# Patient Record
Sex: MALE | Race: WHITE | Employment: OTHER | URBAN - METROPOLITAN AREA
[De-identification: names, ages, dates, MRNs, and addresses within clinical notes are randomized per-mention and may not be internally consistent; named-entity substitution may affect disease eponyms.]

---

## 2017-01-24 ENCOUNTER — OFFICE VISIT (OUTPATIENT)
Dept: FAMILY MEDICINE CLINIC | Age: 67
End: 2017-01-24

## 2017-01-24 VITALS
WEIGHT: 192.6 LBS | SYSTOLIC BLOOD PRESSURE: 132 MMHG | OXYGEN SATURATION: 95 % | HEART RATE: 82 BPM | TEMPERATURE: 98 F | DIASTOLIC BLOOD PRESSURE: 88 MMHG | BODY MASS INDEX: 29.19 KG/M2 | RESPIRATION RATE: 20 BRPM | HEIGHT: 68 IN

## 2017-01-24 DIAGNOSIS — Z13.39 SCREENING FOR ALCOHOLISM: ICD-10-CM

## 2017-01-24 DIAGNOSIS — Z00.00 ROUTINE GENERAL MEDICAL EXAMINATION AT A HEALTH CARE FACILITY: ICD-10-CM

## 2017-01-24 DIAGNOSIS — M65.332 TRIGGER MIDDLE FINGER OF LEFT HAND: ICD-10-CM

## 2017-01-24 DIAGNOSIS — M18.11 OSTEOARTHRITIS OF THUMB, RIGHT: Primary | ICD-10-CM

## 2017-01-24 DIAGNOSIS — Z87.891 PERSONAL HISTORY OF TOBACCO USE, PRESENTING HAZARDS TO HEALTH: ICD-10-CM

## 2017-01-24 DIAGNOSIS — E11.9 CONTROLLED TYPE 2 DIABETES MELLITUS WITHOUT COMPLICATION, WITHOUT LONG-TERM CURRENT USE OF INSULIN (HCC): ICD-10-CM

## 2017-01-24 NOTE — ACP (ADVANCE CARE PLANNING)
Advance Care Planning    Advance Care Planning (ACP) Provider Conversation Snapshot    Date of ACP Conversation: 01/24/17  Persons included in Conversation:  patient  Length of ACP Conversation in minutes:  25 minutes    Authorized Decision Maker (if patient is incapable of making informed decisions):    This person is:   Healthcare Agent/Medical Power of  under Advance Directive          For Patients with Decision Making Capacity:   Values/Goals: Exploration of values, goals, and preferences if recovery is not expected, even with continued medical treatment in the event of:  Imminent death  Severe, permanent brain injury    Conversation Outcomes / Follow-Up Plan:   Recommended completion of Advance Directive form after review of ACP materials and conversation with prospective healthcare agent

## 2017-01-24 NOTE — PROGRESS NOTES
This is a \"Welcome to [employer]"  Initial Preventive Physical Examination (IPPE) providing Personalized Prevention Plan Services (Performed in the first 12 months of enrollment)    I have reviewed the patient's medical history in detail and updated the computerized patient record. History     Past Medical History   Diagnosis Date    Arthritis     Back pain     Blood in stool     Diabetes ([address]     Scoliosis       Past Surgical History   Procedure Laterality Date    Endoscopy, colon, diagnostic  01/31/06     with polypectomy     Current Outpatient Prescriptions   Medication Sig Dispense Refill    naproxen (NAPROSYN) 500 mg tablet Take 1 Tab by mouth two (2) times daily (with meals). 180 Tab 1    lovastatin (MEVACOR) 20 mg tablet Take 1 Tab by mouth nightly. 90 Tab 1    glipiZIDE (GLUCOTROL) 5 mg tablet TAKE 1 TABLET BY MOUTH EVERY DAY 90 Tab 0    pantoprazole (PROTONIX) 40 mg tablet Take 1 Tab by mouth daily. 90 Tab 3    lisinopril (PRINIVIL, ZESTRIL) 20 mg tablet Take 1 Tab by mouth daily. 90 Tab 1    metFORMIN (GLUCOPHAGE) 500 mg tablet Take 1 Tab by mouth two (2) times daily (with meals). 180 Tab 1    glipiZIDE SR (GLUCOTROL) 5 mg CR tablet Take 1 Tab by mouth daily. 90 Tab 3    hydrocortisone (PROCTOSOL HC) 2.5 % rectal cream Insert  into rectum four (4) times daily. 60 g 1    Aspirin, Buffered 81 mg tab Take  by mouth.  clotrimazole (LOTRIMIN) 1 % topical cream Apply  to affected area two (2) times a day. 15 g 3    cyclobenzaprine (FLEXERIL) 10 mg tablet Take one tab by mouth three times a day as need for pain. 90 Tab 0    omega-3 fatty acids Cap Take  by mouth.  azithromycin (ZITHROMAX) 250 mg tablet Take as directed 6 Tab 0    Brompheniramine-Pseudoeph-DM (BROMFED DM) 2-30-10 mg/5 mL syrup Take 5 mL by mouth four (4) times daily as needed. 120 mL 1    glipiZIDE-metFORMIN (METAGLIP) 2.5-500 mg per tablet Take 1 Tab by mouth Before breakfast and dinner.  180 Tab 1    nystatin (MYCOSTATIN) powder Apply twice daily to affected area 30 g 3    pioglitazone (ACTOS) 15 mg tablet Take 1 Tab by mouth daily. 90 Tab 1    ipratropium (ATROVENT) 0.02 % nebulizer solution 2.5 mL by Nebulization route as needed for Wheezing. 2.5 mL 0     No Known Allergies  No family history on file. Social History   Substance Use Topics    Smoking status: Former Smoker    Smokeless tobacco: Never Used    Alcohol use Not on file     Diet, Lifestyle: generally follows a low fat low cholesterol diet, not attempting to follow a low sodium diet, follows a diabetic diet regularly, sedentary, smoker quit, caffeine intake 1-2 cups daily, alcohol intake 1-2 drinks weekly    Exercise level: not active    Depression Risk Screen     PHQ 2 / 9, over the last two weeks 1/24/2017   Little interest or pleasure in doing things Not at all   Feeling down, depressed or hopeless Not at all   Total Score PHQ 2 0     Alcohol Risk Screen   On any occasion during the past 3 months, have you had more than 4 drinks containing alcohol? No    Do you average more than 14 drinks per week? No    Functional Ability and Level of Safety     Hearing Loss   mild-to-moderate    Activities of Daily Living   Self-care     Fall Risk Screen     Fall Risk Assessment, last 12 mths 1/24/2017   Able to walk? Yes   Fall in past 12 months? No     Abuse Screen   Patient is not abused    Review of Systems   A comprehensive review of systems was negative except for: Musculoskeletal: positive for hand pain    Physical Examination     No exam data present     Visit Vitals    /88    Pulse 82    Temp 98 °F (36.7 °C)    Resp 20    Ht 5' 8\" (1.727 m)    Wt 192 lb 9.6 oz (87.4 kg)    SpO2 95%    BMI 29.28 kg/m2     General:  Alert, cooperative, no distress, appears stated age. Head:  Normocephalic, without obvious abnormality, atraumatic. Eyes:  Conjunctivae/corneas clear. PERRL, EOMs intact.  Fundi benign   Ears:  Normal TMs and external ear canals both ears. Nose: Nares normal. Septum midline. Mucosa normal. No drainage or sinus tenderness. Throat: Lips, mucosa, and tongue normal. Teeth and gums normal.   Neck: Supple, symmetrical, trachea midline, no adenopathy, thyroid: no enlargement/tenderness/nodules, no carotid bruit and no JVD. Back:   Symmetric, no curvature. ROM normal. No CVA tenderness. Lungs:   Clear to auscultation bilaterally. Chest wall:  No tenderness or deformity. Heart:  Regular rate and rhythm, S1, S2 normal, no murmur, click, rub or gallop. Abdomen:   Soft, non-tender. Bowel sounds normal. No masses,  No organomegaly. Genitalia:  Normal male without lesion, discharge or tenderness. Rectal:  Normal tone, normal prostate, no masses or tenderness  Guaiac negative stool. Extremities: Extremities normal, atraumatic, no cyanosis or edema. Pulses: 2+ and symmetric all extremities. Skin: Skin color, texture, turgor normal. No rashes or lesions   Lymph nodes: Cervical, supraclavicular, and axillary nodes normal.   Neurologic: CNII-XII intact. Normal strength, sensation and reflexes throughout. EKG Screening: normal EKG, normal sinus rhythm. Patient Care Team:  Lorenza Oneil MD as PCP - General     End-of-life planning  Advanced Directive discussed and documented: YES    Assessment/Plan   Education and counseling provided:  Are appropriate based on today's review and evaluation  End-of-Life planning (with patient's consent)  Pneumococcal Vaccine  Influenza Vaccine  Prostate cancer screening tests (PSA, covered annually)  Colorectal cancer screening tests  Diabetes screening test  current treatment plan is effective, no change in therapy. Procedure:  After consent was obtained, using sterile technique the tendon was prepped. Local anesthetic used: none. Kenalog 10 mg and was then injected and the needle withdrawn. The procedure was well tolerated.   The patient is asked to continue to rest the area for a few more days before resuming regular activities. It may be more painful for the first 1-2 days. Watch for fever, or increased swelling or persistent pain in the joint. Call or return to clinic prn if such symptoms occur or there is failure to improve as anticipated.     [facility]  OFFICE PROCEDURE PROGRESS NOTE        Chart reviewed for the following:   Nakia Naik MD, have reviewed the History, Physical and updated the Allergic reactions for [email or web address] performed immediately prior to start of procedure:   aNkia Naik MD, have performed the following reviews on Haley Iglesias prior to the start of the procedure:            * Patient was identified by name and date of birth   * Agreement on procedure being performed was verified  * Risks and Benefits explained to the patient  * Procedure site verified and marked as necessary  * Patient was positioned for comfort  * Consent was signed and verified     Time: 10 AM      Date of procedure: 1/24/2017    Procedure performed by:  Sofi Arnett MD     Provider assisted by:  none    Patient assisted by: spouse    How tolerated by patient: tolerated the procedure well with no complications    Post Procedural Pain Scale: 2 - Hurts Little Bit    Comments: none

## 2017-01-24 NOTE — MR AVS SNAPSHOT
Visit Information Date & Time Provider Department Dept. Phone Encounter #  
 1/24/2017  9:15 AM Krista Lane, 3 [facility] [phone] [id number] Follow-up Instructions Return in about 3 months (around 4/24/2017). Your Appointments 5/3/2017 10:15 AM  
Follow Up with Krista Lane MD  
3 [facility]) Appt Note: f/u 6 months [address] [facility] [address] [phone]  
  
   
 [address] [facility] [address] [facility] Upcoming Health Maintenance Date Due HEMOGLOBIN A1C Q6M 5/5/2017 FOOT EXAM Q1 5/9/2017 EYE EXAM RETINAL OR DILATED Q1 5/24/2017 MICROALBUMIN Q1 11/5/2017 LIPID PANEL Q1 11/5/2017 MEDICARE YEARLY EXAM 1/25/2018 GLAUCOMA SCREENING Q2Y 5/24/2018 COLONOSCOPY 6/9/2026 DTaP/Tdap/Td series (2 - Td) 1/24/2027 Allergies as of 1/24/2017  Review Complete On: 1/24/2017 By: Krista Lane MD  
 No Known Allergies Current Immunizations  Never Reviewed No immunizations on file. Not reviewed this visit You Were Diagnosed With   
  
 Codes Comments Osteoarthritis of thumb, right    -  Primary ICD-10-CM: M19.041 ICD-9-CM: 715.34 Normal body mass index (BMI)     ICD-10-CM: Z78.9 ICD-9-CM: V49.89 Routine general medical examination at a health care facility     ICD-10-CM: Z00.00 ICD-9-CM: V70.0 Screening for alcoholism     ICD-10-CM: Z13.89 ICD-9-CM: V79.1 Personal history of tobacco use, presenting hazards to health     ICD-10-CM: F93.967 ICD-9-CM: V15.82 Trigger middle finger of left hand     ICD-10-CM: D79.368 ICD-9-CM: 727.03 Controlled type 2 diabetes mellitus without complication, without long-term current use of insulin ([facility] [address]     ICD-10-CM: E11.9 ICD-9-CM: 250.00 Vitals BP Pulse Temp Resp Height(growth percentile) Weight(growth percentile) 132/88 82 98 °F (36.7 °C) 20 5' 8\" (1.727 m) 192 lb 9.6 oz (87.4 kg) SpO2 BMI Smoking Status 95% 29.28 kg/m2 Former Smoker Vitals History BMI and BSA Data Body Mass Index Body Surface Area  
 29.28 kg/m 2 2.05 m 2 Preferred Pharmacy Pharmacy Name Phone [place] [address] [phone] Your Updated Medication List  
  
   
This list is accurate as of: 1/24/17 10:08 AM.  Always use your most recent med list.  
  
  
  
  
 aspirin, buffered 81 mg Tab Take  by mouth. azithromycin 250 mg tablet Commonly known as:  Danette Ax Take as directed Brompheniramine-Pseudoeph-DM 2-30-10 mg/5 mL syrup Commonly known as:  BROMFED DM Take 5 mL by mouth four (4) times daily as needed. clotrimazole 1 % topical cream  
Commonly known as:  Towana Prateek Apply  to affected area two (2) times a day. cyclobenzaprine 10 mg tablet Commonly known as:  FLEXERIL Take one tab by mouth three times a day as need for pain. * glipiZIDE SR 5 mg CR tablet Commonly known as:  GLUCOTROL XL Take 1 Tab by mouth daily. * glipiZIDE 5 mg tablet Commonly known as:  GLUCOTROL  
TAKE 1 TABLET BY MOUTH EVERY DAY  
  
 glipiZIDE-metFORMIN 2.5-500 mg per tablet Commonly known as:  METAGLIP Take 1 Tab by mouth Before breakfast and dinner. hydrocortisone 2.5 % rectal cream  
Commonly known as:  PROCTOSOL HC Insert  into rectum four (4) times daily. ipratropium 0.02 % nebulizer solution Commonly known as:  ATROVENT  
2.5 mL by Nebulization route as needed for Wheezing. lisinopril 20 mg tablet Commonly known as:  Mollie Ripa Take 1 Tab by mouth daily. lovastatin 20 mg tablet Commonly known as:  MEVACOR Take 1 Tab by mouth nightly. metFORMIN 500 mg tablet Commonly known as:  GLUCOPHAGE Take 1 Tab by mouth two (2) times daily (with meals). naproxen 500 mg tablet Commonly known as:  NAPROSYN Take 1 Tab by mouth two (2) times daily (with meals). nystatin powder Commonly known as:  MYCOSTATIN Apply twice daily to affected area  
  
 omega-3 fatty acids Cap Take  by mouth.  
  
 pantoprazole 40 mg tablet Commonly known as:  PROTONIX Take 1 Tab by mouth daily. pioglitazone 15 mg tablet Commonly known as:  ACTOS Take 1 Tab by mouth daily. triamcinolone acetonide 10 mg/mL injection Commonly known as:  KENALOG  
1 mL by IntraMUSCular route once for 1 dose. * Notice: This list has 2 medication(s) that are the same as other medications prescribed for you. Read the directions carefully, and ask your doctor or other care provider to review them with you. We Performed the Following AMB POC EKG ROUTINE W/ 12 LEADS, SCREEN () [ [facility]] TRIAMCINOLONE ACETONIDE INJ [ [facility]] Follow-up Instructions Return in about 3 months (around 4/24/2017). To-Do List   
 01/24/2017 Imaging:  XR THUMB RT MIN 2 V   
  
 01/27/2017 Imaging:  US EXAM SCREENING AAA Introducing [facility] & [facility] SERVICES! Dear Yamilet De La Cruz: Thank you for requesting a Gekko account. Our records indicate that you already have an active Gekko account. You can access your account anytime at [email or web address] Did you know that you can access your hospital and ER discharge instructions at any time in Gekko? You can also review all of your test results from your hospital stay or ER visit. Additional Information If you have questions, please visit the Frequently Asked Questions section of the Gekko website at [email or web address] Remember, Gekko is NOT to be used for urgent needs. For medical emergencies, dial 911. Now available from your iPhone and Android! Please provide this summary of care documentation to your next provider. Your primary care clinician is listed as Freddy Raw. If you have any questions after today's visit, please call [phone].

## 2017-01-24 NOTE — PROGRESS NOTES
Tyrell Garcia is a 73 y.o. male here today for cortisone shot in finger            1. Have you been to the ER, urgent care clinic since your last visit? Hospitalized since your last visit? No    2. Have you seen or consulted any other health care providers outside of the [address] [place] [address] since your last visit? Include any pap smears or colon screening.  No

## 2017-01-24 NOTE — PROGRESS NOTES
HISTORY OF PRESENT ILLNESS  Tony Rueda is a 73 y.o. male. HPI  aodm stable  hbp stable  C/o trigger finger left hand  Review of Systems   Musculoskeletal: Positive for joint pain. All other systems reviewed and are negative. Past Medical History   Diagnosis Date    Arthritis     Back pain     Blood in stool     Diabetes ([address]     Scoliosis      Current Outpatient Prescriptions on File Prior to Visit   Medication Sig Dispense Refill    naproxen (NAPROSYN) 500 mg tablet Take 1 Tab by mouth two (2) times daily (with meals). 180 Tab 1    lovastatin (MEVACOR) 20 mg tablet Take 1 Tab by mouth nightly. 90 Tab 1    glipiZIDE (GLUCOTROL) 5 mg tablet TAKE 1 TABLET BY MOUTH EVERY DAY 90 Tab 0    pantoprazole (PROTONIX) 40 mg tablet Take 1 Tab by mouth daily. 90 Tab 3    lisinopril (PRINIVIL, ZESTRIL) 20 mg tablet Take 1 Tab by mouth daily. 90 Tab 1    metFORMIN (GLUCOPHAGE) 500 mg tablet Take 1 Tab by mouth two (2) times daily (with meals). 180 Tab 1    glipiZIDE SR (GLUCOTROL) 5 mg CR tablet Take 1 Tab by mouth daily. 90 Tab 3    hydrocortisone (PROCTOSOL HC) 2.5 % rectal cream Insert  into rectum four (4) times daily. 60 g 1    Aspirin, Buffered 81 mg tab Take  by mouth.  clotrimazole (LOTRIMIN) 1 % topical cream Apply  to affected area two (2) times a day. 15 g 3    cyclobenzaprine (FLEXERIL) 10 mg tablet Take one tab by mouth three times a day as need for pain. 90 Tab 0    omega-3 fatty acids Cap Take  by mouth.  azithromycin (ZITHROMAX) 250 mg tablet Take as directed 6 Tab 0    Brompheniramine-Pseudoeph-DM (BROMFED DM) 2-30-10 mg/5 mL syrup Take 5 mL by mouth four (4) times daily as needed. 120 mL 1    glipiZIDE-metFORMIN (METAGLIP) 2.5-500 mg per tablet Take 1 Tab by mouth Before breakfast and dinner. 180 Tab 1    nystatin (MYCOSTATIN) powder Apply twice daily to affected area 30 g 3    pioglitazone (ACTOS) 15 mg tablet Take 1 Tab by mouth daily.  90 Tab 1    ipratropium (ATROVENT) 0.02 % nebulizer solution 2.5 mL by Nebulization route as needed for Wheezing. 2.5 mL 0     No current facility-administered medications on file prior to visit. Visit Vitals    /88    Pulse 82    Temp 98 °F (36.7 °C)    Resp 20    Ht 5' 8\" (1.727 m)    Wt 192 lb 9.6 oz (87.4 kg)    SpO2 95%    BMI 29.28 kg/m2         Physical Exam   Constitutional: He appears well-developed and well-nourished. No distress. Musculoskeletal: He exhibits tenderness. He exhibits no edema or deformity. Tender left middle finger, tender right thumb   Skin: He is not diaphoretic. Vitals reviewed.       ASSESSMENT and PLAN  hbp   aodm  Trigger finger  OA thumb  Plan

## 2017-02-01 ENCOUNTER — HOSPITAL ENCOUNTER (OUTPATIENT)
Dept: ULTRASOUND IMAGING | Age: 67
Discharge: HOME | End: 2017-02-01
Attending: INTERNAL MEDICINE
Payer: MEDICARE

## 2017-02-01 DIAGNOSIS — Z87.891 PERSONAL HISTORY OF TOBACCO USE, PRESENTING HAZARDS TO HEALTH: ICD-10-CM

## 2017-02-01 PROCEDURE — 76706 US ABDL AORTA SCREEN AAA: CPT

## 2017-02-03 ENCOUNTER — OFFICE VISIT (OUTPATIENT)
Dept: FAMILY MEDICINE CLINIC | Age: 67
End: 2017-02-03

## 2017-02-03 VITALS
OXYGEN SATURATION: 95 % | RESPIRATION RATE: 20 BRPM | HEIGHT: 68 IN | HEART RATE: 72 BPM | WEIGHT: 192 LBS | TEMPERATURE: 97.5 F | BODY MASS INDEX: 29.1 KG/M2 | SYSTOLIC BLOOD PRESSURE: 116 MMHG | DIASTOLIC BLOOD PRESSURE: 79 MMHG

## 2017-02-03 DIAGNOSIS — M18.11 OSTEOARTHRITIS OF RIGHT THUMB: ICD-10-CM

## 2017-02-03 NOTE — MR AVS SNAPSHOT
Visit Information Date & Time Provider Department Dept. Phone Encounter #  
 2/3/2017  9:45 AM Rose Marie Telles, 3 [facility] [phone] [id number] Follow-up Instructions Return if symptoms worsen or fail to improve. Your Appointments 5/3/2017 10:15 AM  
Follow Up with Rose Marie Telles MD  
3 [facility]) Appt Note: f/u 6 months [address] [facility] [address] [phone]  
  
   
 [address]  [address] [facility] Upcoming Health Maintenance Date Due HEMOGLOBIN A1C Q6M 5/5/2017 FOOT EXAM Q1 5/9/2017 EYE EXAM RETINAL OR DILATED Q1 5/24/2017 MICROALBUMIN Q1 11/5/2017 LIPID PANEL Q1 11/5/2017 MEDICARE YEARLY EXAM 1/25/2018 GLAUCOMA SCREENING Q2Y 5/24/2018 COLONOSCOPY 6/9/2026 DTaP/Tdap/Td series (2 - Td) 1/24/2027 Allergies as of 2/3/2017  Review Complete On: 2/3/2017 By: Rose Marie Telles MD  
 No Known Allergies Current Immunizations  Never Reviewed No immunizations on file. Not reviewed this visit You Were Diagnosed With   
  
 Codes Comments Normal body mass index (BMI)    -  Primary ICD-10-CM: Z78.9 ICD-9-CM: V49.89 Osteoarthritis of right thumb     ICD-10-CM: M19.041 ICD-9-CM: 715.34 Vitals BP Pulse Temp Resp Height(growth percentile) Weight(growth percentile) 116/79 72 97.5 °F (36.4 °C) 20 5' 8\" (1.727 m) 192 lb (87.1 kg) SpO2 BMI Smoking Status 95% 29.19 kg/m2 Former Smoker Vitals History BMI and BSA Data Body Mass Index Body Surface Area  
 29.19 kg/m 2 2.04 m 2 Preferred Pharmacy Pharmacy Name Phone [facility] [address] [phone] Your Updated Medication List  
  
   
This list is accurate as of: 2/3/17 10:20 AM.  Always use your most recent med list.  
  
  
  
  
 aspirin, buffered 81 mg Tab Take  by mouth. azithromycin 250 mg tablet Commonly known as:  Charlyne [place] Take as directed Brompheniramine-Pseudoeph-DM 2-30-10 mg/5 mL syrup Commonly known as:  BROMFED DM Take 5 mL by mouth four (4) times daily as needed. clotrimazole 1 % topical cream  
Commonly known as:  Prakash Bushy Apply  to affected area two (2) times a day. cyclobenzaprine 10 mg tablet Commonly known as:  FLEXERIL Take one tab by mouth three times a day as need for pain. * glipiZIDE SR 5 mg CR tablet Commonly known as:  GLUCOTROL XL Take 1 Tab by mouth daily. * glipiZIDE 5 mg tablet Commonly known as:  GLUCOTROL  
TAKE 1 TABLET BY MOUTH EVERY DAY  
  
 glipiZIDE-metFORMIN 2.5-500 mg per tablet Commonly known as:  METAGLIP Take 1 Tab by mouth Before breakfast and dinner. hydrocortisone 2.5 % rectal cream  
Commonly known as:  PROCTOSOL HC Insert  into rectum four (4) times daily. ipratropium 0.02 % nebulizer solution Commonly known as:  ATROVENT  
2.5 mL by Nebulization route as needed for Wheezing. lisinopril 20 mg tablet Commonly known as:  Marilou [place] Take 1 Tab by mouth daily. lovastatin 20 mg tablet Commonly known as:  MEVACOR Take 1 Tab by mouth nightly. metFORMIN 500 mg tablet Commonly known as:  GLUCOPHAGE Take 1 Tab by mouth two (2) times daily (with meals). naproxen 500 mg tablet Commonly known as:  NAPROSYN Take 1 Tab by mouth two (2) times daily (with meals). nystatin powder Commonly known as:  MYCOSTATIN Apply twice daily to affected area  
  
 omega-3 fatty acids Cap Take  by mouth.  
  
 pantoprazole 40 mg tablet Commonly known as:  PROTONIX Take 1 Tab by mouth daily. pioglitazone 15 mg tablet Commonly known as:  ACTOS Take 1 Tab by mouth daily. triamcinolone acetonide 10 mg/mL injection Commonly known as:  KENALOG 1 mL by IntraMUSCular route once for 1 dose. * Notice: This list has 2 medication(s) that are the same as other medications prescribed for you. Read the directions carefully, and ask your doctor or other care provider to review them with you. We Performed the Following TRIAMCINOLONE ACETONIDE INJ [ [facility] [place]] Follow-up Instructions Return if symptoms worsen or fail to improve. Introducing \[facility] [place] [facility]\"" [facility]! Dear Sarah Son: Thank you for requesting a Referly account. Our records indicate that you already have an active Referly account. You can access your account anytime at [email or web address] Did you know that you can access your hospital and ER discharge instructions at any time in Referly? You can also review all of your test results from your hospital stay or ER visit. Additional Information If you have questions, please visit the Frequently Asked Questions section of the Referly website at [email or web address] Remember, Referly is NOT to be used for urgent needs. For medical emergencies, dial 911. Now available from your iPhone and Android! Please provide this summary of care documentation to your next provider. Your primary care clinician is listed as Price [place]. If you have any questions after today's visit, please call [phone].

## 2017-02-03 NOTE — PROGRESS NOTES
Solange Martinez is a 73 y.o. male here today for right thumb injection. 1. Have you been to the ER, urgent care clinic since your last visit? Hospitalized since your last visit? Yes Where: [facility]    2. Have you seen or consulted any other health care providers outside of the [address] [place] [address] since your last visit? Include any pap smears or colon screening.  No

## 2017-02-03 NOTE — PROGRESS NOTES
HISTORY OF PRESENT ILLNESS  Chris Tolbert is a 73 y.o. male. HPI  OA r thumb for injection  Review of Systems   Musculoskeletal: Positive for joint pain. All other systems reviewed and are negative. Past Medical History   Diagnosis Date    Arthritis     Back pain     Blood in stool     Diabetes ([address]     Scoliosis      Current Outpatient Prescriptions on File Prior to Visit   Medication Sig Dispense Refill    naproxen (NAPROSYN) 500 mg tablet Take 1 Tab by mouth two (2) times daily (with meals). 180 Tab 1    lovastatin (MEVACOR) 20 mg tablet Take 1 Tab by mouth nightly. 90 Tab 1    glipiZIDE (GLUCOTROL) 5 mg tablet TAKE 1 TABLET BY MOUTH EVERY DAY 90 Tab 0    pantoprazole (PROTONIX) 40 mg tablet Take 1 Tab by mouth daily. 90 Tab 3    lisinopril (PRINIVIL, ZESTRIL) 20 mg tablet Take 1 Tab by mouth daily. 90 Tab 1    metFORMIN (GLUCOPHAGE) 500 mg tablet Take 1 Tab by mouth two (2) times daily (with meals). 180 Tab 1    hydrocortisone (PROCTOSOL HC) 2.5 % rectal cream Insert  into rectum four (4) times daily. 60 g 1    nystatin (MYCOSTATIN) powder Apply twice daily to affected area 30 g 3    Aspirin, Buffered 81 mg tab Take  by mouth.  cyclobenzaprine (FLEXERIL) 10 mg tablet Take one tab by mouth three times a day as need for pain. 90 Tab 0    omega-3 fatty acids Cap Take  by mouth.  azithromycin (ZITHROMAX) 250 mg tablet Take as directed 6 Tab 0    Brompheniramine-Pseudoeph-DM (BROMFED DM) 2-30-10 mg/5 mL syrup Take 5 mL by mouth four (4) times daily as needed. 120 mL 1    glipiZIDE SR (GLUCOTROL) 5 mg CR tablet Take 1 Tab by mouth daily. 90 Tab 3    glipiZIDE-metFORMIN (METAGLIP) 2.5-500 mg per tablet Take 1 Tab by mouth Before breakfast and dinner. 180 Tab 1    pioglitazone (ACTOS) 15 mg tablet Take 1 Tab by mouth daily. 90 Tab 1    clotrimazole (LOTRIMIN) 1 % topical cream Apply  to affected area two (2) times a day.  15 g 3    ipratropium (ATROVENT) 0.02 % nebulizer solution 2.5 mL by Nebulization route as needed for Wheezing. 2.5 mL 0     No current facility-administered medications on file prior to visit. Visit Vitals    /79    Pulse 72    Temp 97.5 °F (36.4 °C)    Resp 20    Ht 5' 8\" (1.727 m)    Wt 192 lb (87.1 kg)    SpO2 95%    BMI 29.19 kg/m2         Physical Exam   Constitutional: He appears well-developed and well-nourished. No distress. Musculoskeletal: Normal range of motion. He exhibits tenderness. He exhibits no edema or deformity. Skin: He is not diaphoretic. Vitals reviewed. Reviewed xr  ASSESSMENT and PLAN  oa r thumb   Plan  See procedure  Indications:   Symptom relief from osteoarthritis    Procedure:  After consent was obtained, using sterile technique the right first Aia 16 joint was prepped using Betadine. Local anesthetic used: none. . The joint was entered. Kenalog 10 mg was mixed with none and injected into the joint and the needle withdrawn. The procedure was well tolerated. The patient is asked to continue to rest the joint for a few more days before resuming regular activities. It may be more painful for the first 1-2 days. Watch for fever, or increased swelling or persistent pain in the joint. Call or return to clinic prn if such symptoms occur or there is failure to improve as anticipated.     [facility]  OFFICE PROCEDURE PROGRESS NOTE        Chart reviewed for the following:   Luigi Yo MD, have reviewed the History, Physical and updated the Allergic reactions for [facility] [employer] performed immediately prior to start of procedure:   Luigi Yo MD, have performed the following reviews on Stephen Laser prior to the start of the procedure:            * Patient was identified by name and date of birth   * Agreement on procedure being performed was verified  * Risks and Benefits explained to the patient  * Procedure site verified and marked as necessary  * Patient was positioned for comfort  * Consent was signed and verified     Time: 10:30 AM      Date of procedure: 2/3/2017    Procedure performed by:  Mickey Shane MD    Provider assisted by: Jose Manuel Harman LPN    Patient assisted by: self    How tolerated by patient: tolerated the procedure well with no complications    Post Procedural Pain Scale: 4 - Hurts Little More    Comments: none

## 2017-02-17 RX ORDER — LISINOPRIL 20 MG/1
20 TABLET ORAL DAILY
Qty: 90 TAB | Refills: 3 | Status: SHIPPED | OUTPATIENT
Start: 2017-02-17 | End: 2018-06-04 | Stop reason: SDUPTHER

## 2017-02-28 RX ORDER — GLIPIZIDE 5 MG/1
TABLET ORAL
Qty: 90 TAB | Refills: 0 | Status: SHIPPED | OUTPATIENT
Start: 2017-02-28 | End: 2017-06-19 | Stop reason: SDUPTHER

## 2017-05-03 ENCOUNTER — OFFICE VISIT (OUTPATIENT)
Dept: FAMILY MEDICINE CLINIC | Age: 67
End: 2017-05-03

## 2017-05-03 VITALS
TEMPERATURE: 97.1 F | OXYGEN SATURATION: 95 % | BODY MASS INDEX: 29.4 KG/M2 | HEART RATE: 90 BPM | SYSTOLIC BLOOD PRESSURE: 113 MMHG | HEIGHT: 68 IN | RESPIRATION RATE: 20 BRPM | DIASTOLIC BLOOD PRESSURE: 70 MMHG | WEIGHT: 194 LBS

## 2017-05-03 DIAGNOSIS — E11.9 CONTROLLED TYPE 2 DIABETES MELLITUS WITHOUT COMPLICATION, WITHOUT LONG-TERM CURRENT USE OF INSULIN (HCC): Primary | ICD-10-CM

## 2017-05-03 LAB — HBA1C MFR BLD HPLC: 6.4 %

## 2017-05-03 RX ORDER — HYDROCORTISONE 25 MG/G
CREAM TOPICAL 4 TIMES DAILY
Qty: 60 G | Refills: 1 | Status: SHIPPED | OUTPATIENT
Start: 2017-05-03

## 2017-05-03 NOTE — PROGRESS NOTES
HISTORY OF PRESENT ILLNESS  Gloria Nogueira is a 73 y.o. male. HPI  aodm stable  hbp stable  Review of Systems   All other systems reviewed and are negative. Past Medical History:   Diagnosis Date    Arthritis     Back pain     Blood in stool     Diabetes ([address]     Scoliosis      Current Outpatient Prescriptions on File Prior to Visit   Medication Sig Dispense Refill    glipiZIDE (GLUCOTROL) 5 mg tablet Take one tablet by mouth every day 90 Tab 0    lisinopril (PRINIVIL, ZESTRIL) 20 mg tablet Take 1 Tab by mouth daily. 90 Tab 3    naproxen (NAPROSYN) 500 mg tablet Take 1 Tab by mouth two (2) times daily (with meals). 180 Tab 1    lovastatin (MEVACOR) 20 mg tablet Take 1 Tab by mouth nightly. 90 Tab 1    pantoprazole (PROTONIX) 40 mg tablet Take 1 Tab by mouth daily. 90 Tab 3    metFORMIN (GLUCOPHAGE) 500 mg tablet Take 1 Tab by mouth two (2) times daily (with meals). 180 Tab 1    glipiZIDE SR (GLUCOTROL) 5 mg CR tablet Take 1 Tab by mouth daily. 90 Tab 3    hydrocortisone (PROCTOSOL HC) 2.5 % rectal cream Insert  into rectum four (4) times daily. 60 g 1    glipiZIDE-metFORMIN (METAGLIP) 2.5-500 mg per tablet Take 1 Tab by mouth Before breakfast and dinner. 180 Tab 1    nystatin (MYCOSTATIN) powder Apply twice daily to affected area 30 g 3    Aspirin, Buffered 81 mg tab Take  by mouth.  pioglitazone (ACTOS) 15 mg tablet Take 1 Tab by mouth daily. 90 Tab 1    clotrimazole (LOTRIMIN) 1 % topical cream Apply  to affected area two (2) times a day. 15 g 3    ipratropium (ATROVENT) 0.02 % nebulizer solution 2.5 mL by Nebulization route as needed for Wheezing. 2.5 mL 0    cyclobenzaprine (FLEXERIL) 10 mg tablet Take one tab by mouth three times a day as need for pain. 90 Tab 0    omega-3 fatty acids Cap Take  by mouth.       azithromycin (ZITHROMAX) 250 mg tablet Take as directed 6 Tab 0    Brompheniramine-Pseudoeph-DM (BROMFED DM) 2-30-10 mg/5 mL syrup Take 5 mL by mouth four (4) times daily as needed. 120 mL 1     No current facility-administered medications on file prior to visit. Visit Vitals    /70 (BP 1 Location: Right arm, BP Patient Position: Sitting)    Pulse 90    Temp 97.1 °F (36.2 °C) (Oral)    Resp 20    Ht 5' 8\" (1.727 m)    Wt 194 lb (88 kg)    SpO2 95%    BMI 29.5 kg/m2         Physical Exam   Constitutional: He appears well-developed and well-nourished. No distress. Musculoskeletal: Normal range of motion. He exhibits no edema, tenderness or deformity. Skin: He is not diaphoretic. Vitals reviewed.     A1C 6.4  ASSESSMENT and PLAN  aodm   hbp  Plan  Recheck in 6 months

## 2017-05-03 NOTE — MR AVS SNAPSHOT
Visit Information Date & Time Provider Department Dept. Phone Encounter #  
 5/3/2017 10:15 AM [facility] [phone] [id number] Follow-up Instructions Return in about 6 months (around 11/3/2017). Follow-up and Disposition History Upcoming Health Maintenance Date Due  
 EYE EXAM RETINAL OR DILATED Q1 5/24/2017 INFLUENZA AGE 9 TO ADULT 8/1/2017 HEMOGLOBIN A1C Q6M 11/3/2017 MICROALBUMIN Q1 11/5/2017 LIPID PANEL Q1 11/5/2017 MEDICARE YEARLY EXAM 1/25/2018 FOOT EXAM Q1 5/3/2018 GLAUCOMA SCREENING Q2Y 5/24/2018 COLONOSCOPY 6/9/2026 DTaP/Tdap/Td series (2 - Td) 1/24/2027 Allergies as of 5/3/2017  Review Complete On: 5/3/2017 By: Saleem Howard MD  
 No Known Allergies Current Immunizations  Never Reviewed No immunizations on file. Not reviewed this visit You Were Diagnosed With   
  
 Codes Comments Controlled type 2 diabetes mellitus without complication, without long-term current use of insulin ([facility]    -  Primary ICD-10-CM: E11.9 ICD-9-CM: 250.00 Vitals BP Pulse Temp Resp Height(growth percentile) Weight(growth percentile) 113/70 (BP 1 Location: Right arm, BP Patient Position: Sitting) 90 97.1 °F (36.2 °C) (Oral) 20 5' 8\" (1.727 m) 194 lb (88 kg) SpO2 BMI Smoking Status 95% 29.5 kg/m2 Former Smoker Vitals History BMI and BSA Data Body Mass Index Body Surface Area  
 29.5 kg/m 2 2.05 m 2 Preferred Pharmacy Pharmacy Name Phone [facility] [address] [phone] Your Updated Medication List  
  
   
This list is accurate as of: 5/3/17 10:40 AM.  Always use your most recent med list.  
  
  
  
  
 aspirin, buffered 81 mg Tab Take  by mouth. azithromycin 250 mg tablet Commonly known as:  Abad Lomaxbs Take as directed Brompheniramine-Pseudoeph-DM 2-30-10 mg/5 mL syrup Commonly known as:  BROMFED DM Take 5 mL by mouth four (4) times daily as needed. clotrimazole 1 % topical cream  
Commonly known as:  Leodis Peaks Apply  to affected area two (2) times a day. cyclobenzaprine 10 mg tablet Commonly known as:  FLEXERIL Take one tab by mouth three times a day as need for pain. * glipiZIDE SR 5 mg CR tablet Commonly known as:  GLUCOTROL XL Take 1 Tab by mouth daily. * glipiZIDE 5 mg tablet Commonly known as:  Allyne Lover Take one tablet by mouth every day  
  
 glipiZIDE-metFORMIN 2.5-500 mg per tablet Commonly known as:  METAGLIP Take 1 Tab by mouth Before breakfast and dinner. hydrocortisone 2.5 % rectal cream  
Commonly known as:  PROCTOSOL HC Insert  into rectum four (4) times daily. ipratropium 0.02 % nebulizer solution Commonly known as:  ATROVENT  
2.5 mL by Nebulization route as needed for Wheezing. lisinopril 20 mg tablet Commonly known as:  Dorette Darren Take 1 Tab by mouth daily. lovastatin 20 mg tablet Commonly known as:  MEVACOR Take 1 Tab by mouth nightly. metFORMIN 500 mg tablet Commonly known as:  GLUCOPHAGE Take 1 Tab by mouth two (2) times daily (with meals). naproxen 500 mg tablet Commonly known as:  NAPROSYN Take 1 Tab by mouth two (2) times daily (with meals). nystatin powder Commonly known as:  MYCOSTATIN Apply twice daily to affected area  
  
 omega-3 fatty acids Cap Take  by mouth.  
  
 pantoprazole 40 mg tablet Commonly known as:  PROTONIX Take 1 Tab by mouth daily. pioglitazone 15 mg tablet Commonly known as:  ACTOS Take 1 Tab by mouth daily. * Notice: This list has 2 medication(s) that are the same as other medications prescribed for you. Read the directions carefully, and ask your doctor or other care provider to review them with you. Prescriptions Sent to Pharmacy Refills hydrocortisone (PROCTOSOL HC) 2.5 % rectal cream 1 Sig: Insert  into rectum four (4) times daily. Class: Normal  
 Pharmacy: [facility] [address] [facility] #: [phone] Route: Rectal  
  
We Performed the Following AMB POC HEMOGLOBIN A1C [86471 CPT(R)] Follow-up Instructions Return in about 6 months (around 11/3/2017). Introducing [facility] & [facility] SERVICES! Dear Rohan Nuñez: Thank you for requesting a [employer] account. Our records indicate that you already have an active [employer] account. You can access your account anytime at [email or web address]. [email or web address] Did you know that you can access your hospital and ER discharge instructions at any time in [employer]? You can also review all of your test results from your hospital stay or ER visit. Additional Information If you have questions, please visit the Frequently Asked Questions section of the [employer] website at [email or web address] Remember, [employer] is NOT to be used for urgent needs. For medical emergencies, dial 911. Now available from your iPhone and Android! Please provide this summary of care documentation to your next provider. Your primary care clinician is listed as Georgina Wang. If you have any questions after today's visit, please call [phone].

## 2017-05-03 NOTE — PROGRESS NOTES
Chief Complaint   Patient presents with    Diabetes    Cholesterol Problem    Hypertension    Back Pain     pain scale 4/10     1. Have you been to the ER, urgent care clinic since your last visit? Hospitalized since your last visit? No    2. Have you seen or consulted any other health care providers outside of the [address] [place] [address] since your last visit? Include any pap smears or colon screening.  No

## 2017-06-20 RX ORDER — GLIPIZIDE 5 MG/1
TABLET ORAL
Qty: 90 TAB | Refills: 0 | Status: SHIPPED | OUTPATIENT
Start: 2017-06-20 | End: 2017-09-25 | Stop reason: SDUPTHER

## 2017-07-03 RX ORDER — LOVASTATIN 20 MG/1
TABLET ORAL
Qty: 90 TAB | Refills: 0 | Status: SHIPPED | OUTPATIENT
Start: 2017-07-03 | End: 2017-10-26 | Stop reason: SDUPTHER

## 2017-08-15 RX ORDER — METFORMIN HYDROCHLORIDE 500 MG/1
TABLET ORAL
Qty: 180 TAB | Refills: 0 | Status: SHIPPED | OUTPATIENT
Start: 2017-08-15 | End: 2018-03-12 | Stop reason: SDUPTHER

## 2017-09-25 RX ORDER — GLIPIZIDE 5 MG/1
TABLET ORAL
Qty: 90 TAB | Refills: 0 | Status: SHIPPED | OUTPATIENT
Start: 2017-09-25 | End: 2017-11-08 | Stop reason: SDUPTHER

## 2017-09-25 RX ORDER — NAPROXEN 500 MG/1
TABLET ORAL
Qty: 180 TAB | Refills: 0 | Status: SHIPPED | OUTPATIENT
Start: 2017-09-25 | End: 2018-02-01 | Stop reason: SDUPTHER

## 2017-09-26 RX ORDER — CHLORPHENIRAMINE MALEATE 4 MG
TABLET ORAL 2 TIMES DAILY
Qty: 15 G | Refills: 3 | Status: SHIPPED | OUTPATIENT
Start: 2017-09-26

## 2017-09-26 RX ORDER — NYSTATIN 100000 [USP'U]/G
POWDER TOPICAL
Qty: 30 G | Refills: 3 | Status: SHIPPED | OUTPATIENT
Start: 2017-09-26

## 2017-10-04 RX ORDER — PANTOPRAZOLE SODIUM 40 MG/1
40 TABLET, DELAYED RELEASE ORAL DAILY
Qty: 90 TAB | Refills: 3 | Status: SHIPPED | OUTPATIENT
Start: 2017-10-04 | End: 2018-10-06 | Stop reason: SDUPTHER

## 2017-10-27 RX ORDER — LOVASTATIN 20 MG/1
TABLET ORAL
Qty: 90 TAB | Refills: 0 | Status: SHIPPED | OUTPATIENT
Start: 2017-10-27 | End: 2018-03-12 | Stop reason: SDUPTHER

## 2017-11-02 ENCOUNTER — TELEPHONE (OUTPATIENT)
Dept: FAMILY MEDICINE CLINIC | Age: 67
End: 2017-11-02

## 2017-11-02 DIAGNOSIS — E11.9 CONTROLLED TYPE 2 DIABETES MELLITUS WITHOUT COMPLICATION, WITHOUT LONG-TERM CURRENT USE OF INSULIN (HCC): ICD-10-CM

## 2017-11-02 DIAGNOSIS — E78.5 HYPERLIPIDEMIA, UNSPECIFIED HYPERLIPIDEMIA TYPE: ICD-10-CM

## 2017-11-02 DIAGNOSIS — N40.1 BENIGN PROSTATIC HYPERPLASIA WITH LOWER URINARY TRACT SYMPTOMS, SYMPTOM DETAILS UNSPECIFIED: Primary | ICD-10-CM

## 2017-11-02 NOTE — TELEPHONE ENCOUNTER
Pt called requesting to have lab work ordered to be completed at [facility] prior to his upcoming appt. Please review and order accordingly.

## 2017-11-03 ENCOUNTER — HOSPITAL ENCOUNTER (OUTPATIENT)
Dept: LAB | Age: 67
Discharge: HOME | End: 2017-11-03
Payer: MEDICARE

## 2017-11-03 DIAGNOSIS — N40.1 BENIGN PROSTATIC HYPERPLASIA WITH LOWER URINARY TRACT SYMPTOMS, SYMPTOM DETAILS UNSPECIFIED: ICD-10-CM

## 2017-11-03 DIAGNOSIS — E78.5 HYPERLIPIDEMIA, UNSPECIFIED HYPERLIPIDEMIA TYPE: ICD-10-CM

## 2017-11-03 DIAGNOSIS — E11.9 CONTROLLED TYPE 2 DIABETES MELLITUS WITHOUT COMPLICATION, WITHOUT LONG-TERM CURRENT USE OF INSULIN (HCC): ICD-10-CM

## 2017-11-03 LAB
ALBUMIN SERPL-MCNC: 3.9 G/DL (ref 3.4–5)
ALBUMIN/GLOB SERPL: 1.4 {RATIO} (ref 0.8–1.7)
ALP SERPL-CCNC: 60 U/L (ref 45–117)
ALT SERPL-CCNC: 56 U/L (ref 16–61)
ANION GAP SERPL CALC-SCNC: 3 MMOL/L (ref 3–18)
AST SERPL-CCNC: 35 U/L (ref 15–37)
BASOPHILS # BLD: 0 K/UL (ref 0–0.06)
BASOPHILS NFR BLD: 0 % (ref 0–2)
BILIRUB SERPL-MCNC: 0.7 MG/DL (ref 0.2–1)
BUN SERPL-MCNC: 21 MG/DL (ref 7–18)
BUN/CREAT SERPL: 19 (ref 12–20)
CALCIUM SERPL-MCNC: 8.2 MG/DL (ref 8.5–10.1)
CHLORIDE SERPL-SCNC: 105 MMOL/L (ref 100–108)
CHOLEST SERPL-MCNC: 176 MG/DL
CO2 SERPL-SCNC: 33 MMOL/L (ref 21–32)
CREAT SERPL-MCNC: 1.09 MG/DL (ref 0.6–1.3)
CREAT UR-MCNC: 180 MG/DL (ref 30–125)
DIFFERENTIAL METHOD BLD: ABNORMAL
EOSINOPHIL # BLD: 0.2 K/UL (ref 0–0.4)
EOSINOPHIL NFR BLD: 3 % (ref 0–5)
ERYTHROCYTE [DISTWIDTH] IN BLOOD BY AUTOMATED COUNT: 13.5 % (ref 11.6–14.5)
EST. AVERAGE GLUCOSE BLD GHB EST-MCNC: 137 MG/DL
GLOBULIN SER CALC-MCNC: 2.7 G/DL (ref 2–4)
GLUCOSE SERPL-MCNC: 122 MG/DL (ref 74–99)
HBA1C MFR BLD: 6.4 % (ref 4.2–5.6)
HCT VFR BLD AUTO: 46.7 % (ref 36–48)
HDLC SERPL-MCNC: 50 MG/DL (ref 40–60)
HDLC SERPL: 3.5 {RATIO} (ref 0–5)
HGB BLD-MCNC: 16 G/DL (ref 13–16)
LDLC SERPL CALC-MCNC: 80.4 MG/DL (ref 0–100)
LIPID PROFILE,FLP: ABNORMAL
LYMPHOCYTES # BLD: 1.8 K/UL (ref 0.9–3.6)
LYMPHOCYTES NFR BLD: 31 % (ref 21–52)
MCH RBC QN AUTO: 30.3 PG (ref 24–34)
MCHC RBC AUTO-ENTMCNC: 34.3 G/DL (ref 31–37)
MCV RBC AUTO: 88.4 FL (ref 74–97)
MICROALBUMIN UR-MCNC: 0.71 MG/DL (ref 0–3)
MICROALBUMIN/CREAT UR-RTO: 4 MG/G (ref 0–30)
MONOCYTES # BLD: 0.6 K/UL (ref 0.05–1.2)
MONOCYTES NFR BLD: 11 % (ref 3–10)
NEUTS SEG # BLD: 3.3 K/UL (ref 1.8–8)
NEUTS SEG NFR BLD: 55 % (ref 40–73)
PLATELET # BLD AUTO: 202 K/UL (ref 135–420)
PMV BLD AUTO: 10.6 FL (ref 9.2–11.8)
POTASSIUM SERPL-SCNC: 4.9 MMOL/L (ref 3.5–5.5)
PROT SERPL-MCNC: 6.6 G/DL (ref 6.4–8.2)
PSA SERPL-MCNC: 0.6 NG/ML (ref 0–4)
RBC # BLD AUTO: 5.28 M/UL (ref 4.7–5.5)
SODIUM SERPL-SCNC: 141 MMOL/L (ref 136–145)
T4 FREE SERPL-MCNC: 1.4 NG/DL (ref 0.7–1.5)
TRIGL SERPL-MCNC: 228 MG/DL (ref ?–150)
TSH SERPL DL<=0.05 MIU/L-ACNC: 1.19 UIU/ML (ref 0.36–3.74)
VLDLC SERPL CALC-MCNC: 45.6 MG/DL
WBC # BLD AUTO: 6 K/UL (ref 4.6–13.2)

## 2017-11-03 PROCEDURE — 83036 HEMOGLOBIN GLYCOSYLATED A1C: CPT | Performed by: INTERNAL MEDICINE

## 2017-11-03 PROCEDURE — 84153 ASSAY OF PSA TOTAL: CPT | Performed by: INTERNAL MEDICINE

## 2017-11-03 PROCEDURE — 80061 LIPID PANEL: CPT | Performed by: INTERNAL MEDICINE

## 2017-11-03 PROCEDURE — 80053 COMPREHEN METABOLIC PANEL: CPT | Performed by: INTERNAL MEDICINE

## 2017-11-03 PROCEDURE — 85025 COMPLETE CBC W/AUTO DIFF WBC: CPT | Performed by: INTERNAL MEDICINE

## 2017-11-03 PROCEDURE — 84439 ASSAY OF FREE THYROXINE: CPT | Performed by: INTERNAL MEDICINE

## 2017-11-03 PROCEDURE — 84443 ASSAY THYROID STIM HORMONE: CPT | Performed by: INTERNAL MEDICINE

## 2017-11-03 PROCEDURE — 36415 COLL VENOUS BLD VENIPUNCTURE: CPT | Performed by: INTERNAL MEDICINE

## 2017-11-03 PROCEDURE — 82043 UR ALBUMIN QUANTITATIVE: CPT | Performed by: INTERNAL MEDICINE

## 2017-11-08 ENCOUNTER — OFFICE VISIT (OUTPATIENT)
Dept: FAMILY MEDICINE CLINIC | Age: 67
End: 2017-11-08

## 2017-11-08 VITALS
RESPIRATION RATE: 18 BRPM | WEIGHT: 193 LBS | TEMPERATURE: 96.8 F | BODY MASS INDEX: 29.25 KG/M2 | DIASTOLIC BLOOD PRESSURE: 76 MMHG | HEIGHT: 68 IN | SYSTOLIC BLOOD PRESSURE: 111 MMHG | HEART RATE: 88 BPM | OXYGEN SATURATION: 93 %

## 2017-11-08 DIAGNOSIS — H91.91 HEARING LOSS OF RIGHT EAR, UNSPECIFIED HEARING LOSS TYPE: ICD-10-CM

## 2017-11-08 DIAGNOSIS — E11.9 CONTROLLED TYPE 2 DIABETES MELLITUS WITHOUT COMPLICATION, WITHOUT LONG-TERM CURRENT USE OF INSULIN (HCC): Primary | ICD-10-CM

## 2017-11-08 DIAGNOSIS — K92.1 BLOOD IN STOOL: ICD-10-CM

## 2017-11-08 NOTE — MR AVS SNAPSHOT
Visit Information Date & Time Provider Department Dept. Phone Encounter #  
 11/8/2017 10:15 AM Eric Levy, 3 [facility] [phone] [id number] Follow-up Instructions Return in about 6 months (around 5/8/2018). Follow-up and Disposition History Upcoming Health Maintenance Date Due Influenza Age 5 to Adult 8/1/2017 EYE EXAM RETINAL OR DILATED Q1 9/8/2018* MEDICARE YEARLY EXAM 1/25/2018 FOOT EXAM Q1 5/3/2018 HEMOGLOBIN A1C Q6M 5/3/2018 GLAUCOMA SCREENING Q2Y 5/24/2018 Pneumococcal 65+ Low/Medium Risk (2 of 2 - PPSV23) 11/3/2018 MICROALBUMIN Q1 11/3/2018 LIPID PANEL Q1 11/3/2018 COLONOSCOPY 6/9/2026 DTaP/Tdap/Td series (2 - Td) 1/24/2027 *Topic was postponed. The date shown is not the original due date. Allergies as of 11/8/2017  Review Complete On: 11/8/2017 By: Eric Levy MD  
 No Known Allergies Current Immunizations  Never Reviewed Name Date Influenza High Dose Vaccine PF 10/3/2017 Not reviewed this visit You Were Diagnosed With   
  
 Codes Comments Controlled type 2 diabetes mellitus without complication, without long-term current use of insulin ([facility]    -  Primary ICD-10-CM: E11.9 ICD-9-CM: 250.00 Blood in stool     ICD-10-CM: K92.1 ICD-9-CM: 578.1 Hearing loss of right ear, unspecified hearing loss type     ICD-10-CM: H91.91 
ICD-9-CM: 389. 9 Vitals BP Pulse Temp Resp Height(growth percentile) Weight(growth percentile) 111/76 (BP 1 Location: Right arm, BP Patient Position: Sitting) 88 96.8 °F (36 °C) (Oral) 18 5' 8\" (1.727 m) 193 lb (87.5 kg) SpO2 BMI Smoking Status 93% 29.35 kg/m2 Former Smoker BMI and BSA Data Body Mass Index Body Surface Area  
 29.35 kg/m 2 2.05 m 2 Preferred Pharmacy Pharmacy Name Phone [facility] [address] [phone] Your Updated Medication List  
  
   
This list is accurate as of: 11/8/17 10:46 AM.  Always use your most recent med list.  
  
  
  
  
 aspirin, buffered 81 mg Tab Take  by mouth. clotrimazole 1 % topical cream  
Commonly known as:  Suzanna  Apply  to affected area two (2) times a day. cyclobenzaprine 10 mg tablet Commonly known as:  FLEXERIL Take one tab by mouth three times a day as need for pain. glipiZIDE SR 5 mg CR tablet Commonly known as:  GLUCOTROL XL Take 1 Tab by mouth daily. glipiZIDE-metFORMIN 2.5-500 mg per tablet Commonly known as:  METAGLIP Take 1 Tab by mouth Before breakfast and dinner. hydrocortisone 2.5 % rectal cream  
Commonly known as:  PROCTOSOL HC Insert  into rectum four (4) times daily. ipratropium 0.02 % Soln Commonly known as:  ATROVENT  
2.5 mL by Nebulization route as needed for Wheezing. lisinopril 20 mg tablet Commonly known as:  Casiano Sg Take 1 Tab by mouth daily. lovastatin 20 mg tablet Commonly known as:  MEVACOR  
take one tablet by mouth nightly  
  
 metFORMIN 500 mg tablet Commonly known as:  GLUCOPHAGE  
TAKE 1 TABLET BY MOUTH TWICE DAILY WITH MEALS  
  
 naproxen 500 mg tablet Commonly known as:  NAPROSYN  
take one tablet by mouth two times daily with a meal  
  
 nystatin powder Commonly known as:  MYCOSTATIN Apply twice daily to affected area  
  
 omega-3 fatty acids Cap Take  by mouth.  
  
 pantoprazole 40 mg tablet Commonly known as:  PROTONIX Take 1 Tab by mouth daily. pioglitazone 15 mg tablet Commonly known as:  ACTOS Take 1 Tab by mouth daily. Follow-up Instructions Return in about 6 months (around 5/8/2018). Introducing [facility] & HEALTH SERVICES! Dear Karl Dill: Thank you for requesting a GPNX account. Our records indicate that you already have an active GPNX account.   You can access your account anytime at [email or web address]. [email or web address] Did you know that you can access your hospital and ER discharge instructions at any time in [employer]? You can also review all of your test results from your hospital stay or ER visit. Additional Information If you have questions, please visit the Frequently Asked Questions section of the [employer] website at [email or web address] Remember, [employer] is NOT to be used for urgent needs. For medical emergencies, dial 911. Now available from your iPhone and Android! Please provide this summary of care documentation to your next provider. Your primary care clinician is listed as Rand Junior. If you have any questions after today's visit, please call [phone].

## 2017-11-08 NOTE — PROGRESS NOTES
Martha Newby is a 78 y.o. male (: 1950) presenting to address:    Chief Complaint   Patient presents with    Diabetes    Cholesterol Problem     pain scale 0/10    Hypertension       Vitals:    17 1004   BP: 111/76   Pulse: 88   Resp: 18   Temp: 96.8 °F (36 °C)   TempSrc: Oral   SpO2: 93%   Weight: 193 lb (87.5 kg)   Height: 5' 8\" (1.727 m)   PainSc:   0 - No pain       Hearing/Vision:   No exam data present    Learning Assessment:     Learning Assessment 3/14/2014   PRIMARY LEARNER Patient   HIGHEST LEVEL OF EDUCATION - PRIMARY LEARNER  4 YEARS OF COLLEGE   BARRIERS PRIMARY LEARNER NONE   PRIMARY LANGUAGE ENGLISH   LEARNER PREFERENCE PRIMARY OTHER (COMMENT)   ANSWERED BY patient   RELATIONSHIP SELF     Depression Screening:     PHQ over the last two weeks 2017   Little interest or pleasure in doing things Not at all   Feeling down, depressed or hopeless Not at all   Total Score PHQ 2 0     Fall Risk Assessment:     Fall Risk Assessment, last 12 mths 2017   Able to walk? Yes   Fall in past 12 months? No     Abuse Screening:     Abuse Screening Questionnaire 3/14/2014   Do you ever feel afraid of your partner? N   Are you in a relationship with someone who physically or mentally threatens you? N   Is it safe for you to go home? Y     Coordination of Care Questionaire:   1. Have you been to the ER, urgent care clinic since your last visit? Hospitalized since your last visit? NO    2. Have you seen or consulted any other health care providers outside of the [address] [place] [address] since your last visit? Include any pap smears or colon screening. NO    Advanced Directive:   1. Do you have an Advanced Directive? NO    2. Would you like information on Advanced Directives?  YES

## 2017-11-08 NOTE — PROGRESS NOTES
HISTORY OF PRESENT ILLNESS  Minor Schreiber is a 78 y.o. male. HPI  aodm  Stable  C/o temporary hearing loss on right  Review of Systems   HENT: Positive for congestion. All other systems reviewed and are negative. Past Medical History:   Diagnosis Date    Arthritis     Back pain     Blood in stool     Diabetes ([address]     Scoliosis      Current Outpatient Prescriptions on File Prior to Visit   Medication Sig Dispense Refill    lovastatin (MEVACOR) 20 mg tablet take one tablet by mouth nightly 90 Tab 0    pantoprazole (PROTONIX) 40 mg tablet Take 1 Tab by mouth daily. 90 Tab 3    clotrimazole (LOTRIMIN) 1 % topical cream Apply  to affected area two (2) times a day. 15 g 3    nystatin (MYCOSTATIN) powder Apply twice daily to affected area 30 g 3    naproxen (NAPROSYN) 500 mg tablet take one tablet by mouth two times daily with a meal 180 Tab 0    metFORMIN (GLUCOPHAGE) 500 mg tablet TAKE 1 TABLET BY MOUTH TWICE DAILY WITH MEALS 180 Tab 0    hydrocortisone (PROCTOSOL HC) 2.5 % rectal cream Insert  into rectum four (4) times daily. 60 g 1    lisinopril (PRINIVIL, ZESTRIL) 20 mg tablet Take 1 Tab by mouth daily. 90 Tab 3    glipiZIDE SR (GLUCOTROL) 5 mg CR tablet Take 1 Tab by mouth daily. 90 Tab 3    glipiZIDE-metFORMIN (METAGLIP) 2.5-500 mg per tablet Take 1 Tab by mouth Before breakfast and dinner. 180 Tab 1    Aspirin, Buffered 81 mg tab Take  by mouth.  pioglitazone (ACTOS) 15 mg tablet Take 1 Tab by mouth daily. 90 Tab 1    ipratropium (ATROVENT) 0.02 % nebulizer solution 2.5 mL by Nebulization route as needed for Wheezing. 2.5 mL 0    cyclobenzaprine (FLEXERIL) 10 mg tablet Take one tab by mouth three times a day as need for pain. 90 Tab 0    omega-3 fatty acids Cap Take  by mouth. No current facility-administered medications on file prior to visit.       Visit Vitals    /76 (BP 1 Location: Right arm, BP Patient Position: Sitting)    Pulse 88    Temp 96.8 °F (36 °C) (Oral)    Resp 18    Ht 5' 8\" (1.727 m)    Wt 193 lb (87.5 kg)    SpO2 93%    BMI 29.35 kg/m2         Physical Exam   Constitutional: He appears well-developed and well-nourished. No distress. HENT:   Head: Normocephalic and atraumatic. Tm dull x 2  Carvajal test louder on left  rine's test ok x 2   Skin: He is not diaphoretic. Vitals reviewed.   reviewed labs    ASSESSMENT and PLAN  aodm   Temporary hearing loss  Plan  reassurance  Consider audiologist  Recheck in 6 month

## 2018-02-01 RX ORDER — GLIPIZIDE 5 MG/1
TABLET ORAL
Qty: 90 TAB | Refills: 0 | Status: SHIPPED | OUTPATIENT
Start: 2018-02-01 | End: 2018-06-04 | Stop reason: SDUPTHER

## 2018-02-01 RX ORDER — NAPROXEN 500 MG/1
TABLET ORAL
Qty: 180 TAB | Refills: 0 | Status: SHIPPED | OUTPATIENT
Start: 2018-02-01 | End: 2018-05-09 | Stop reason: SDUPTHER

## 2018-03-13 RX ORDER — LOVASTATIN 20 MG/1
TABLET ORAL
Qty: 90 TAB | Refills: 0 | Status: SHIPPED | OUTPATIENT
Start: 2018-03-13 | End: 2018-07-09 | Stop reason: SDUPTHER

## 2018-03-13 RX ORDER — METFORMIN HYDROCHLORIDE 500 MG/1
TABLET ORAL
Qty: 180 TAB | Refills: 0 | Status: SHIPPED | OUTPATIENT
Start: 2018-03-13

## 2018-05-09 ENCOUNTER — OFFICE VISIT (OUTPATIENT)
Dept: FAMILY MEDICINE CLINIC | Age: 68
End: 2018-05-09

## 2018-05-09 VITALS
DIASTOLIC BLOOD PRESSURE: 80 MMHG | OXYGEN SATURATION: 95 % | TEMPERATURE: 97.8 F | WEIGHT: 191.2 LBS | HEART RATE: 98 BPM | SYSTOLIC BLOOD PRESSURE: 120 MMHG | HEIGHT: 68 IN | BODY MASS INDEX: 28.98 KG/M2 | RESPIRATION RATE: 20 BRPM

## 2018-05-09 DIAGNOSIS — Z00.00 ROUTINE GENERAL MEDICAL EXAMINATION AT A HEALTH CARE FACILITY: ICD-10-CM

## 2018-05-09 DIAGNOSIS — E11.9 CONTROLLED TYPE 2 DIABETES MELLITUS WITHOUT COMPLICATION, WITHOUT LONG-TERM CURRENT USE OF INSULIN (HCC): ICD-10-CM

## 2018-05-09 DIAGNOSIS — D56.0 ALPHA-THALASSEMIA (HCC): Primary | ICD-10-CM

## 2018-05-09 PROBLEM — I10 ESSENTIAL HYPERTENSION: Status: ACTIVE | Noted: 2018-05-09

## 2018-05-09 PROBLEM — K21.9 GASTROESOPHAGEAL REFLUX DISEASE WITHOUT ESOPHAGITIS: Status: ACTIVE | Noted: 2018-05-09

## 2018-05-09 PROBLEM — M15.9 PRIMARY OSTEOARTHRITIS INVOLVING MULTIPLE JOINTS: Status: ACTIVE | Noted: 2018-05-09

## 2018-05-09 LAB — HBA1C MFR BLD HPLC: 6.5 %

## 2018-05-09 RX ORDER — ECONAZOLE NITRATE 10 MG/G
CREAM TOPICAL 2 TIMES DAILY
Qty: 30 G | Refills: 3 | Status: SHIPPED | OUTPATIENT
Start: 2018-05-09

## 2018-05-09 RX ORDER — NAPROXEN 500 MG/1
500 TABLET ORAL 2 TIMES DAILY WITH MEALS
Qty: 180 TAB | Refills: 1 | Status: SHIPPED | OUTPATIENT
Start: 2018-05-09

## 2018-05-09 NOTE — MR AVS SNAPSHOT
[address] [address] [address]  [address] [address] [facility] [address] [phone] Patient: Sadia Dickey MRN: [id number] SPS:[id number] Visit Information Date & Time Provider Department Dept. Phone Encounter #  
 5/9/2018 10:15 AM Carol FranzHarley [phone] [id number] Follow-up Instructions Return in about 6 months (around 11/9/2018). Follow-up and Disposition History Upcoming Health Maintenance Date Due  
 EYE EXAM RETINAL OR DILATED Q1 9/8/2018* Influenza Age 5 to Adult 8/1/2018 Pneumococcal 65+ Low/Medium Risk (2 of 2 - PPSV23) 11/3/2018 MICROALBUMIN Q1 11/3/2018 LIPID PANEL Q1 11/3/2018 HEMOGLOBIN A1C Q6M 11/9/2018 FOOT EXAM Q1 5/9/2019 MEDICARE YEARLY EXAM 5/10/2019 GLAUCOMA SCREENING Q2Y 8/28/2019 COLONOSCOPY 6/9/2026 DTaP/Tdap/Td series (2 - Td) 1/24/2027 *Topic was postponed. The date shown is not the original due date. Allergies as of 5/9/2018  Review Complete On: 5/9/2018 By: Carol Franz MD  
 No Known Allergies Current Immunizations  Never Reviewed Name Date Influenza High Dose Vaccine PF 10/3/2017 Not reviewed this visit You Were Diagnosed With   
  
 Codes Comments Alpha-thalassemia ([facility] [address]    -  Primary ICD-10-CM: D56.0 ICD-9-CM: 282.43 Controlled type 2 diabetes mellitus without complication, without long-term current use of insulin ([facility] [address]     ICD-10-CM: E11.9 ICD-9-CM: 250.00 Routine general medical examination at a health care facility     ICD-10-CM: Z00.00 ICD-9-CM: V70.0 Vitals BP Pulse Temp Resp Height(growth percentile) Weight(growth percentile) 120/80 (BP 1 Location: Right arm, BP Patient Position: Sitting) 98 97.8 °F (36.6 °C) (Oral) 20 5' 8\" (1.727 m) 191 lb 3.2 oz (86.7 kg) SpO2 BMI Smoking Status 95% 29.07 kg/m2 Former Smoker BMI and BSA Data Body Mass Index Body Surface Area  
 29.07 kg/m 2 2.04 m 2 Preferred Pharmacy Pharmacy Name Phone [address] [place] [address] [phone] Your Updated Medication List  
  
   
This list is accurate as of 5/9/18 10:53 AM.  Always use your most recent med list.  
  
  
  
  
 aspirin, buffered 81 mg Tab Take  by mouth. clotrimazole 1 % topical cream  
Commonly known as:  Deronda Hint Apply  to affected area two (2) times a day. cyclobenzaprine 10 mg tablet Commonly known as:  FLEXERIL Take one tab by mouth three times a day as need for pain. econazole nitrate 1 % topical cream  
Commonly known as:  Yadav Carolina Apply  to affected area two (2) times a day. glipiZIDE 5 mg tablet Commonly known as:  GLUCOTROL  
TAKE ONE TABLET BY MOUTH ONCE DAILY  
  
 hydrocortisone 2.5 % rectal cream  
Commonly known as:  PROCTOSOL HC Insert  into rectum four (4) times daily. lisinopril 20 mg tablet Commonly known as:  Umberto Boehringer Take 1 Tab by mouth daily. lovastatin 20 mg tablet Commonly known as:  MEVACOR  
take one tablet by mouth nightly  
  
 metFORMIN 500 mg tablet Commonly known as:  GLUCOPHAGE  
take 1 tablet by mouth twice daily with meals  
  
 naproxen 500 mg tablet Commonly known as:  NAPROSYN Take 1 Tab by mouth two (2) times daily (with meals). nystatin powder Commonly known as:  MYCOSTATIN Apply twice daily to affected area  
  
 omega-3 fatty acids Cap Take  by mouth.  
  
 pantoprazole 40 mg tablet Commonly known as:  PROTONIX Take 1 Tab by mouth daily. Prescriptions Sent to Pharmacy Refills  
 naproxen (NAPROSYN) 500 mg tablet 1 Sig: Take 1 Tab by mouth two (2) times daily (with meals). Class: Normal  
 Pharmacy: [id number] - [place], [place] [facility] #: [phone]  Route: Oral  
 econazole nitrate (SPECTAZOLE) 1 % topical cream 3  
 Sig: Apply  to affected area two (2) times a day. Class: Normal  
 Pharmacy: [id number] - [place], [address]  #: [phone] Route: Topical  
  
We Performed the Following AMB POC HEMOGLOBIN A1C [14063 CPT(R)] Follow-up Instructions Return in about 6 months (around 11/9/2018). Patient Instructions Body Mass Index: Care Instructions Your Care Instructions Body mass index (BMI) can help you see if your weight is raising your risk for health problems. It uses a formula to compare how much you weigh with how tall you are. · A BMI lower than 18.5 is considered underweight. · A BMI between 18.5 and 24.9 is considered healthy. · A BMI between 25 and 29.9 is considered overweight. A BMI of 30 or higher is considered obese. If your BMI is in the normal range, it means that you have a lower risk for weight-related health problems. If your BMI is in the overweight or obese range, you may be at increased risk for weight-related health problems, such as high blood pressure, heart disease, stroke, arthritis or joint pain, and diabetes. If your BMI is in the underweight range, you may be at increased risk for health problems such as fatigue, lower protection (immunity) against illness, muscle loss, bone loss, hair loss, and hormone problems. BMI is just one measure of your risk for weight-related health problems. You may be at higher risk for health problems if you are not active, you eat an unhealthy diet, or you drink too much alcohol or use tobacco products. Follow-up care is a key part of your treatment and safety. Be sure to make and go to all appointments, and call your doctor if you are having problems. It's also a good idea to know your test results and keep a list of the medicines you take. How can you care for yourself at home? · Practice healthy eating habits.  This includes eating plenty of fruits, vegetables, whole grains, lean protein, and low-fat dairy. · If your doctor recommends it, get more exercise. Walking is a good choice. Bit by bit, increase the amount you walk every day. Try for at least 30 minutes on most days of the week. · Do not smoke. Smoking can increase your risk for health problems. If you need help quitting, talk to your doctor about stop-smoking programs and medicines. These can increase your chances of quitting for good. · Limit alcohol to 2 drinks a day for men and 1 drink a day for women. Too much alcohol can cause health problems. If you have a BMI higher than 25 · Your doctor may do other tests to check your risk for weight-related health problems. This may include measuring the distance around your waist. A waist measurement of more than 40 inches in men or 35 inches in women can increase the risk of weight-related health problems. · Talk with your doctor about steps you can take to stay healthy or improve your health. You may need to make lifestyle changes to lose weight and stay healthy, such as changing your diet and getting regular exercise. If you have a BMI lower than 18.5 · Your doctor may do other tests to check your risk for health problems. · Talk with your doctor about steps you can take to stay healthy or improve your health. You may need to make lifestyle changes to gain or maintain weight and stay healthy, such as getting more healthy foods in your diet and doing exercises to build muscle. Where can you learn more? Go to http://aime-yuri.info/. Enter S176 in the search box to learn more about \"Body Mass Index: Care Instructions. \" Current as of: October 13, 2016 Content Version: 11.4 © 1005-4484 [employer]wise[employer]. Care instructions adapted under license by [employer] (which disclaims liability or warranty for this information).  If you have questions about a medical condition or this instruction, always ask your healthcare professional. [address] [address] any warranty or liability for your use of this information. Medicare Wellness Visit, Male The best way to live healthy is to have a healthy lifestyle by eating a well-balanced diet, exercising regularly, limiting alcohol and stopping smoking. Regular physical exams and screening tests are another way to keep healthy. Preventive exams provided by your health care provider can find health problems before they become diseases or illnesses. Preventive services including immunizations, screening tests, monitoring and exams can help you take care of your own health. All people over age 72 should have a pneumovax  and and a prevnar shot to prevent pneumonia. These are once in a lifetime unless you and your provider decide differently. All people over 65 should have a yearly flu shot and a tetanus vaccine every 10 years. Screening for diabetes mellitus with a blood sugar test should be done every year. Glaucoma is a disease of the eye due to increased ocular pressure that can lead to blindness and it should be done every year by an eye professional. 
 
Cardiovascular screening tests that check for elevated lipids (fatty part of blood) which can lead to heart disease and strokes should be done every 5 years. Colorectal screening that evaluates for blood or polyps in your colon should be done yearly as a stool test or every five years as a flexible sigmoidoscope or every 10 years as a colonoscopy up to age 76. Men up to age 76 may need a screening blood test for prostate cancer at certain intervals, depending on their personal and family history. This decision is between the patient and his provider. If you have been a smoker or had family history of abdominal aortic aneurysms, you and your provider may decide to schedule an ultrasound test of your aorta. Hepatitis C screening is also recommended for anyone born between [birth year] [address]. A shingles vaccine is also recommended once in a lifetime after age 61. Your Medicare Wellness Exam is recommended annually. Here is a list of your current Health Maintenance items with a due date: There are no preventive care reminders to display for this patient. Patient Instructions History Introducing [facility]! Dear Margarito Chris: Thank you for requesting a [employer] account. Our records indicate that you already have an active [employer] account. You can access your account anytime at [email or web address] Did you know that you can access your hospital and ER discharge instructions at any time in [employer]? You can also review all of your test results from your hospital stay or ER visit. Additional Information If you have questions, please visit the Frequently Asked Questions section of the [employer] website at [email or web address] Remember, [employer] is NOT to be used for urgent needs. For medical emergencies, dial 911. Now available from your iPhone and Android! Please provide this summary of care documentation to your next provider. Your primary care clinician is listed as t-Art. If you have any questions after today's visit, please call [phone].

## 2018-05-09 NOTE — PROGRESS NOTES
Eloise Oconnor is a 78 y.o. male (: 1950) presenting to address:    Chief Complaint   Patient presents with   Harmeet Karimi Annual Wellness Visit     pain scale 0/10       Vitals:    18 1024   BP: 120/80   Pulse: 98   Resp: 20   Temp: 97.8 °F (36.6 °C)   TempSrc: Oral   SpO2: 95%   Weight: 191 lb 3.2 oz (86.7 kg)   Height: 5' 8\" (1.727 m)   PainSc:   0 - No pain       Hearing/Vision:   No exam data present    Learning Assessment:     Learning Assessment 3/14/2014   PRIMARY LEARNER Patient   HIGHEST LEVEL OF EDUCATION - PRIMARY LEARNER  4 YEARS OF COLLEGE   BARRIERS PRIMARY LEARNER NONE   PRIMARY LANGUAGE ENGLISH   LEARNER PREFERENCE PRIMARY OTHER (COMMENT)   ANSWERED BY patient   RELATIONSHIP SELF     Depression Screening:     PHQ over the last two weeks 2018   Little interest or pleasure in doing things Not at all   Feeling down, depressed or hopeless Not at all   Total Score PHQ 2 0     Fall Risk Assessment:     Fall Risk Assessment, last 12 mths 2018   Able to walk? Yes   Fall in past 12 months? No     Abuse Screening:     Abuse Screening Questionnaire 3/14/2014   Do you ever feel afraid of your partner? N   Are you in a relationship with someone who physically or mentally threatens you? N   Is it safe for you to go home? Y     Coordination of Care Questionaire:   1. Have you been to the ER, urgent care clinic since your last visit? Hospitalized since your last visit? NO    2. Have you seen or consulted any other health care providers outside of the [address] [place] [address] since your last visit? Include any pap smears or colon screening. NO    Advanced Directive:   1. Do you have an Advanced Directive? YES    2. Would you like information on Advanced Directives?  NO

## 2018-05-09 NOTE — PROGRESS NOTES
HISTORY OF PRESENT ILLNESS  Edward Walker is a 78 y.o. male. HPI  aodm stable  thalassemia stable    Review of Systems   Musculoskeletal: Positive for joint pain. All other systems reviewed and are negative. Past Medical History:   Diagnosis Date    Arthritis     Back pain     Blood in stool     Diabetes ([address]     Scoliosis      Current Outpatient Prescriptions on File Prior to Visit   Medication Sig Dispense Refill    lovastatin (MEVACOR) 20 mg tablet take one tablet by mouth nightly 90 Tab 0    metFORMIN (GLUCOPHAGE) 500 mg tablet take 1 tablet by mouth twice daily with meals 180 Tab 0    naproxen (NAPROSYN) 500 mg tablet TAKE ONE TABLET BY MOUTH TWO TIMES DAILY WITH A MEAL  180 Tab 0    glipiZIDE (GLUCOTROL) 5 mg tablet TAKE ONE TABLET BY MOUTH ONCE DAILY  90 Tab 0    pantoprazole (PROTONIX) 40 mg tablet Take 1 Tab by mouth daily. 90 Tab 3    clotrimazole (LOTRIMIN) 1 % topical cream Apply  to affected area two (2) times a day. 15 g 3    nystatin (MYCOSTATIN) powder Apply twice daily to affected area 30 g 3    hydrocortisone (PROCTOSOL HC) 2.5 % rectal cream Insert  into rectum four (4) times daily. 60 g 1    lisinopril (PRINIVIL, ZESTRIL) 20 mg tablet Take 1 Tab by mouth daily. 90 Tab 3    Aspirin, Buffered 81 mg tab Take  by mouth.  cyclobenzaprine (FLEXERIL) 10 mg tablet Take one tab by mouth three times a day as need for pain. 90 Tab 0    omega-3 fatty acids Cap Take  by mouth. No current facility-administered medications on file prior to visit. Visit Vitals    /80 (BP 1 Location: Right arm, BP Patient Position: Sitting)    Pulse 98    Temp 97.8 °F (36.6 °C) (Oral)    Resp 20    Ht 5' 8\" (1.727 m)    Wt 191 lb 3.2 oz (86.7 kg)    SpO2 95%    BMI 29.07 kg/m2         Physical Exam   Constitutional: He appears well-developed and well-nourished. Musculoskeletal: Normal range of motion. He exhibits no edema, tenderness or deformity.    Skin: He is not diaphoretic. Vitals reviewed.   A1C-    ASSESSMENT and PLAN  aodm   Plan  Continue rx  Recheck in 6 months

## 2018-05-09 NOTE — PROGRESS NOTES
Discussed the patient's BMI with him. The BMI follow up plan is as follows:     dietary management education, guidance, and counseling  encourage exercise  monitor weight  prescribed dietary intake    An After Visit Summary was printed and given to the patient. This is an Initial Medicare Annual Wellness Exam (AWV) (Performed 12 months after IPPE or effective date of Medicare Part B enrollment, Once in a lifetime)    I have reviewed the patient's medical history in detail and updated the computerized patient record. History     Past Medical History:   Diagnosis Date    Arthritis     Back pain     Blood in stool     Diabetes ([address]     Scoliosis       Past Surgical History:   Procedure Laterality Date    ENDOSCOPY, COLON, DIAGNOSTIC  01/31/06    with polypectomy     Current Outpatient Prescriptions   Medication Sig Dispense Refill    lovastatin (MEVACOR) 20 mg tablet take one tablet by mouth nightly 90 Tab 0    metFORMIN (GLUCOPHAGE) 500 mg tablet take 1 tablet by mouth twice daily with meals 180 Tab 0    naproxen (NAPROSYN) 500 mg tablet TAKE ONE TABLET BY MOUTH TWO TIMES DAILY WITH A MEAL  180 Tab 0    glipiZIDE (GLUCOTROL) 5 mg tablet TAKE ONE TABLET BY MOUTH ONCE DAILY  90 Tab 0    pantoprazole (PROTONIX) 40 mg tablet Take 1 Tab by mouth daily. 90 Tab 3    clotrimazole (LOTRIMIN) 1 % topical cream Apply  to affected area two (2) times a day. 15 g 3    nystatin (MYCOSTATIN) powder Apply twice daily to affected area 30 g 3    hydrocortisone (PROCTOSOL HC) 2.5 % rectal cream Insert  into rectum four (4) times daily. 60 g 1    lisinopril (PRINIVIL, ZESTRIL) 20 mg tablet Take 1 Tab by mouth daily. 90 Tab 3    Aspirin, Buffered 81 mg tab Take  by mouth.  cyclobenzaprine (FLEXERIL) 10 mg tablet Take one tab by mouth three times a day as need for pain. 90 Tab 0    omega-3 fatty acids Cap Take  by mouth. No Known Allergies  No family history on file.   Social History   Substance Use Topics    Smoking status: Former Smoker    Smokeless tobacco: Never Used    Alcohol use Not on file     Patient Active Problem List   Diagnosis Code    Blood in stool K92.1    Controlled type 2 diabetes mellitus without complication, without long-term current use of insulin ([facility]) E11.9    Primary osteoarthritis involving multiple joints M15.0    Essential hypertension I10    Gastroesophageal reflux disease without esophagitis K21.9    Alpha-thalassemia ([facility] [address] D56.0       Depression Risk Factor Screening:     PHQ over the last two weeks 5/9/2018   Little interest or pleasure in doing things Not at all   Feeling down, depressed or hopeless Not at all   Total Score PHQ 2 0     Alcohol Risk Factor Screening: You do not drink alcohol or very rarely. Functional Ability and Level of Safety:     Hearing Loss  Hearing is good. Activities of Daily Living  The home contains: no safety equipment. Patient does total self care    Fall Risk  Fall Risk Assessment, last 12 mths 5/9/2018   Able to walk? Yes   Fall in past 12 months? No       Abuse Screen  Patient is not abused    Cognitive Screening   Evaluation of Cognitive Function:  Has your family/caregiver stated any concerns about your memory: no  Normal    Patient Care Team   Patient Care Team:  Juan José Jimenez MD as PCP - General    Assessment/Plan   Education and counseling provided:  Are appropriate based on today's review and evaluation  End-of-Life planning (with patient's consent)  Pneumococcal Vaccine  Influenza Vaccine  Prostate cancer screening tests (PSA, covered annually)  Colorectal cancer screening tests  Diabetes screening test  Screening UTDDiagnoses and all orders for this visit:    1. Alpha-thalassemia ([facility] [address]    2. Controlled type 2 diabetes mellitus without complication, without long-term current use of insulin ([facility] [address]         There are no preventive care reminders to display for this patient.

## 2018-05-09 NOTE — PATIENT INSTRUCTIONS
Body Mass Index: Care Instructions  Your Care Instructions    Body mass index (BMI) can help you see if your weight is raising your risk for health problems. It uses a formula to compare how much you weigh with how tall you are. · A BMI lower than 18.5 is considered underweight. · A BMI between 18.5 and 24.9 is considered healthy. · A BMI between 25 and 29.9 is considered overweight. A BMI of 30 or higher is considered obese. If your BMI is in the normal range, it means that you have a lower risk for weight-related health problems. If your BMI is in the overweight or obese range, you may be at increased risk for weight-related health problems, such as high blood pressure, heart disease, stroke, arthritis or joint pain, and diabetes. If your BMI is in the underweight range, you may be at increased risk for health problems such as fatigue, lower protection (immunity) against illness, muscle loss, bone loss, hair loss, and hormone problems. BMI is just one measure of your risk for weight-related health problems. You may be at higher risk for health problems if you are not active, you eat an unhealthy diet, or you drink too much alcohol or use tobacco products. Follow-up care is a key part of your treatment and safety. Be sure to make and go to all appointments, and call your doctor if you are having problems. It's also a good idea to know your test results and keep a list of the medicines you take. How can you care for yourself at home? · Practice healthy eating habits. This includes eating plenty of fruits, vegetables, whole grains, lean protein, and low-fat dairy. · If your doctor recommends it, get more exercise. Walking is a good choice. Bit by bit, increase the amount you walk every day. Try for at least 30 minutes on most days of the week. · Do not smoke. Smoking can increase your risk for health problems. If you need help quitting, talk to your doctor about stop-smoking programs and medicines. These can increase your chances of quitting for good. · Limit alcohol to 2 drinks a day for men and 1 drink a day for women. Too much alcohol can cause health problems. If you have a BMI higher than 25  · Your doctor may do other tests to check your risk for weight-related health problems. This may include measuring the distance around your waist. A waist measurement of more than 40 inches in men or 35 inches in women can increase the risk of weight-related health problems. · Talk with your doctor about steps you can take to stay healthy or improve your health. You may need to make lifestyle changes to lose weight and stay healthy, such as changing your diet and getting regular exercise. If you have a BMI lower than 18.5  · Your doctor may do other tests to check your risk for health problems. · Talk with your doctor about steps you can take to stay healthy or improve your health. You may need to make lifestyle changes to gain or maintain weight and stay healthy, such as getting more healthy foods in your diet and doing exercises to build muscle. Where can you learn more? Go to http://aime-yuri.info/. Enter S176 in the search box to learn more about \"Body Mass Index: Care Instructions. \"  Current as of: October 13, 2016  Content Version: 11.4  © 2827-4900 [employer]. Care instructions adapted under license by [employer] (which disclaims liability or warranty for this information). If you have questions about a medical condition or this instruction, always ask your healthcare professional. [place] [address] any warranty or liability for your use of this information. Medicare Wellness Visit, Male    The best way to live healthy is to have a healthy lifestyle by eating a well-balanced diet, exercising regularly, limiting alcohol and stopping smoking. Regular physical exams and screening tests are another way to keep healthy.    Preventive exams provided by your health care provider can find health problems before they become diseases or illnesses. Preventive services including immunizations, screening tests, monitoring and exams can help you take care of your own health. All people over age 72 should have a pneumovax  and and a prevnar shot to prevent pneumonia. These are once in a lifetime unless you and your provider decide differently. All people over 65 should have a yearly flu shot and a tetanus vaccine every 10 years. Screening for diabetes mellitus with a blood sugar test should be done every year. Glaucoma is a disease of the eye due to increased ocular pressure that can lead to blindness and it should be done every year by an eye professional.    Cardiovascular screening tests that check for elevated lipids (fatty part of blood) which can lead to heart disease and strokes should be done every 5 years. Colorectal screening that evaluates for blood or polyps in your colon should be done yearly as a stool test or every five years as a flexible sigmoidoscope or every 10 years as a colonoscopy up to age 76. Men up to age 76 may need a screening blood test for prostate cancer at certain intervals, depending on their personal and family history. This decision is between the patient and his provider. If you have been a smoker or had family history of abdominal aortic aneurysms, you and your provider may decide to schedule an ultrasound test of your aorta. Hepatitis C screening is also recommended for anyone born between [birth year] through [address]. A shingles vaccine is also recommended once in a lifetime after age 61. Your Medicare Wellness Exam is recommended annually. Here is a list of your current Health Maintenance items with a due date: There are no preventive care reminders to display for this patient.

## 2018-06-04 RX ORDER — LISINOPRIL 20 MG/1
TABLET ORAL
Qty: 90 TAB | Refills: 2 | Status: SHIPPED | OUTPATIENT
Start: 2018-06-04

## 2018-06-04 RX ORDER — GLIPIZIDE 5 MG/1
TABLET ORAL
Qty: 90 TAB | Refills: 0 | Status: SHIPPED | OUTPATIENT
Start: 2018-06-04

## 2018-07-06 ENCOUNTER — TELEPHONE (OUTPATIENT)
Dept: FAMILY MEDICINE CLINIC | Age: 68
End: 2018-07-06

## 2018-07-06 NOTE — TELEPHONE ENCOUNTER
Patient and his wife were scheduled for 25th of July. They would like to see Rose Button on or before the 20th of July. Please give them a call back to be scheduled.

## 2018-07-09 RX ORDER — LOVASTATIN 20 MG/1
TABLET ORAL
Qty: 6 TAB | Refills: 0 | Status: SHIPPED | OUTPATIENT
Start: 2018-07-09 | End: 2018-07-16 | Stop reason: SDUPTHER

## 2018-07-10 ENCOUNTER — OFFICE VISIT (OUTPATIENT)
Dept: FAMILY MEDICINE CLINIC | Age: 68
End: 2018-07-10

## 2018-07-10 VITALS
TEMPERATURE: 97.8 F | BODY MASS INDEX: 29.16 KG/M2 | HEART RATE: 67 BPM | SYSTOLIC BLOOD PRESSURE: 128 MMHG | HEIGHT: 68 IN | RESPIRATION RATE: 18 BRPM | DIASTOLIC BLOOD PRESSURE: 88 MMHG | OXYGEN SATURATION: 96 % | WEIGHT: 192.4 LBS

## 2018-07-10 DIAGNOSIS — E11.9 CONTROLLED TYPE 2 DIABETES MELLITUS WITHOUT COMPLICATION, WITHOUT LONG-TERM CURRENT USE OF INSULIN (HCC): Primary | ICD-10-CM

## 2018-07-10 NOTE — PROGRESS NOTES
Matthews Gosselin is a 78 y.o. male (: 1950) presenting to address:    Chief Complaint   Patient presents with    Follow-up     pain scale 0/10       Vitals:    07/10/18 1128   BP: 128/88   Pulse: 67   Resp: 18   Temp: 97.8 °F (36.6 °C)   TempSrc: Oral   SpO2: 96%   Weight: 192 lb 6.4 oz (87.3 kg)   Height: 5' 8\" (1.727 m)   PainSc:   0 - No pain       Hearing/Vision:   No exam data present    Learning Assessment:     Learning Assessment 3/14/2014   PRIMARY LEARNER Patient   HIGHEST LEVEL OF EDUCATION - PRIMARY LEARNER  4 YEARS OF COLLEGE   BARRIERS PRIMARY LEARNER NONE   PRIMARY LANGUAGE ENGLISH   LEARNER PREFERENCE PRIMARY OTHER (COMMENT)   ANSWERED BY patient   RELATIONSHIP SELF     Depression Screening:     PHQ over the last two weeks 7/10/2018   Little interest or pleasure in doing things Not at all   Feeling down, depressed or hopeless Not at all   Total Score PHQ 2 0     Fall Risk Assessment:     Fall Risk Assessment, last 12 mths 7/10/2018   Able to walk? Yes   Fall in past 12 months? No     Abuse Screening:     Abuse Screening Questionnaire 2018   Do you ever feel afraid of your partner? N   Are you in a relationship with someone who physically or mentally threatens you? N   Is it safe for you to go home? Y     Coordination of Care Questionaire:   1. Have you been to the ER, urgent care clinic since your last visit? Hospitalized since your last visit? NO    2. Have you seen or consulted any other health care providers outside of the [facility] since your last visit? Include any pap smears or colon screening. NO    Advanced Directive:   1. Do you have an Advanced Directive? NO    2. Would you like information on Advanced Directives?  NO

## 2018-07-16 RX ORDER — LOVASTATIN 20 MG/1
20 TABLET ORAL DAILY
Qty: 6 TAB | Refills: 0 | Status: SHIPPED | OUTPATIENT
Start: 2018-07-16 | End: 2018-07-19 | Stop reason: SDUPTHER

## 2018-07-19 RX ORDER — LOVASTATIN 20 MG/1
20 TABLET ORAL DAILY
Qty: 90 TAB | Refills: 1 | Status: SHIPPED | OUTPATIENT
Start: 2018-07-19

## 2018-10-16 RX ORDER — PANTOPRAZOLE SODIUM 40 MG/1
TABLET, DELAYED RELEASE ORAL
Qty: 30 TAB | Refills: 0 | Status: SHIPPED | OUTPATIENT
Start: 2018-10-16 | End: 2018-11-20 | Stop reason: SDUPTHER

## 2018-10-16 NOTE — TELEPHONE ENCOUNTER
Last OV: 7/10/2018  Last filled: 10/4/2017 90 tabs 3 refills     Patient has not established with new provider.

## 2018-11-21 RX ORDER — PANTOPRAZOLE SODIUM 40 MG/1
TABLET, DELAYED RELEASE ORAL
Qty: 30 TAB | Refills: 0 | Status: SHIPPED | OUTPATIENT
Start: 2018-11-21

## 2018-11-26 RX ORDER — METFORMIN HYDROCHLORIDE 500 MG/1
TABLET ORAL
Qty: 180 TAB | Refills: 0 | OUTPATIENT
Start: 2018-11-26

## 2021-03-15 ENCOUNTER — HOSPITAL ENCOUNTER (OUTPATIENT)
Dept: LAB | Age: 71
Discharge: HOME | End: 2021-03-15
Payer: MEDICARE

## 2021-03-15 LAB
ALBUMIN SERPL-MCNC: 4.2 G/DL (ref 3.4–5)
ALBUMIN/GLOB SERPL: 1.7 {RATIO} (ref 0.8–1.7)
ALP SERPL-CCNC: 60 U/L (ref 45–117)
ALT SERPL-CCNC: 32 U/L (ref 16–61)
ANION GAP SERPL CALC-SCNC: 2 MMOL/L (ref 3–18)
AST SERPL-CCNC: 22 U/L (ref 10–38)
BASOPHILS # BLD: 0.1 K/UL (ref 0–0.1)
BASOPHILS NFR BLD: 1 % (ref 0–2)
BILIRUB SERPL-MCNC: 0.6 MG/DL (ref 0.2–1)
BUN SERPL-MCNC: 23 MG/DL (ref 7–18)
BUN/CREAT SERPL: 18 (ref 12–20)
CALCIUM SERPL-MCNC: 8.2 MG/DL (ref 8.5–10.1)
CHLORIDE SERPL-SCNC: 109 MMOL/L (ref 100–111)
CO2 SERPL-SCNC: 29 MMOL/L (ref 21–32)
CREAT SERPL-MCNC: 1.3 MG/DL (ref 0.6–1.3)
DIFFERENTIAL METHOD BLD: NORMAL
EOSINOPHIL # BLD: 0.2 K/UL (ref 0–0.4)
EOSINOPHIL NFR BLD: 2 % (ref 0–5)
ERYTHROCYTE [DISTWIDTH] IN BLOOD BY AUTOMATED COUNT: 13.6 % (ref 11.6–14.5)
GLOBULIN SER CALC-MCNC: 2.5 G/DL (ref 2–4)
GLUCOSE SERPL-MCNC: 110 MG/DL (ref 74–99)
HCT VFR BLD AUTO: 47 % (ref 36–48)
HGB BLD-MCNC: 15.9 G/DL (ref 13–16)
LYMPHOCYTES # BLD: 2.4 K/UL (ref 0.9–3.6)
LYMPHOCYTES NFR BLD: 32 % (ref 21–52)
MCH RBC QN AUTO: 30.2 PG (ref 24–34)
MCHC RBC AUTO-ENTMCNC: 33.8 G/DL (ref 31–37)
MCV RBC AUTO: 89.4 FL (ref 74–97)
MONOCYTES # BLD: 0.8 K/UL (ref 0.05–1.2)
MONOCYTES NFR BLD: 10 % (ref 3–10)
NEUTS SEG # BLD: 4.3 K/UL (ref 1.8–8)
NEUTS SEG NFR BLD: 55 % (ref 40–73)
PLATELET # BLD AUTO: 224 K/UL (ref 135–420)
PMV BLD AUTO: 10.1 FL (ref 9.2–11.8)
POTASSIUM SERPL-SCNC: 4.5 MMOL/L (ref 3.5–5.5)
PROT SERPL-MCNC: 6.7 G/DL (ref 6.4–8.2)
RBC # BLD AUTO: 5.26 M/UL (ref 4.7–5.5)
SODIUM SERPL-SCNC: 140 MMOL/L (ref 136–145)
WBC # BLD AUTO: 7.7 K/UL (ref 4.6–13.2)

## 2021-03-15 PROCEDURE — 82977 ASSAY OF GGT: CPT

## 2021-03-15 PROCEDURE — 36415 COLL VENOUS BLD VENIPUNCTURE: CPT

## 2021-03-15 PROCEDURE — 80053 COMPREHEN METABOLIC PANEL: CPT

## 2021-03-15 PROCEDURE — 85025 COMPLETE CBC W/AUTO DIFF WBC: CPT

## 2021-03-15 PROCEDURE — 86803 HEPATITIS C AB TEST: CPT

## 2021-03-16 LAB
GGT SERPL-CCNC: 34 U/L (ref 15–85)
HCV AB SER IA-ACNC: 0.05 INDEX
HCV AB SERPL QL IA: NEGATIVE
HCV COMMENT,HCGAC: NORMAL

## 2021-07-21 ENCOUNTER — HOSPITAL ENCOUNTER (EMERGENCY)
Age: 71
Discharge: HOME | End: 2021-07-21
Attending: EMERGENCY MEDICINE
Payer: MEDICARE

## 2021-07-21 VITALS
TEMPERATURE: 98.3 F | RESPIRATION RATE: 18 BRPM | HEART RATE: 87 BPM | DIASTOLIC BLOOD PRESSURE: 96 MMHG | SYSTOLIC BLOOD PRESSURE: 165 MMHG | OXYGEN SATURATION: 98 %

## 2021-07-21 DIAGNOSIS — E11.649 HYPOGLYCEMIC EPISODE IN PATIENT WITH DIABETES MELLITUS (HCC): ICD-10-CM

## 2021-07-21 DIAGNOSIS — R55 SYNCOPE, UNSPECIFIED SYNCOPE TYPE: Primary | ICD-10-CM

## 2021-07-21 LAB — GLUCOSE BLD STRIP.AUTO-MCNC: 153 MG/DL (ref 70–110)

## 2021-07-21 PROCEDURE — 99285 EMERGENCY DEPT VISIT HI MDM: CPT

## 2021-07-21 PROCEDURE — 74011250636 HC RX REV CODE- 250/636: Performed by: EMERGENCY MEDICINE

## 2021-07-21 PROCEDURE — 82962 GLUCOSE BLOOD TEST: CPT

## 2021-07-21 PROCEDURE — 96360 HYDRATION IV INFUSION INIT: CPT

## 2021-07-21 PROCEDURE — 93005 ELECTROCARDIOGRAM TRACING: CPT

## 2021-07-21 RX ORDER — SODIUM CHLORIDE 9 MG/ML
500 INJECTION, SOLUTION INTRAVENOUS ONCE
Status: COMPLETED | OUTPATIENT
Start: 2021-07-21 | End: 2021-07-21

## 2021-07-21 RX ADMIN — SODIUM CHLORIDE 500 ML: 900 INJECTION, SOLUTION INTRAVENOUS at 15:44

## 2021-07-21 NOTE — ED TRIAGE NOTES
Patient was at [facility] and went to sit down and fell. BS per EMS was initially 52. They administered some D10 and repeat BS was 175. Patient states he still feels a little \"whoozy\" but feels better.

## 2021-07-21 NOTE — DISCHARGE INSTRUCTIONS
Eat regular meals. Your medication requirements may be declining. Follow-up with your primary care physician to discuss ongoing treatment options. Oral glucose ingestion for recurrent symptoms. Return for acutely worsening symptoms or recurrent fainting.

## 2021-07-21 NOTE — ED PROVIDER NOTES
10year-old male history of diabetes on glipizide and Metformin presents after hypoglycemic episode with associated syncope. Patient was in a local department store felt lightheaded and felt tingling around his lips. He states this is happened previously association with low blood sugar. He attempted to find a place to sit down and then had brief syncopal episode. His blood sugar upon paramedic arrival was 52. He received D10 IV and prehospital recheck was 175. He denies injury related to the fall. No chest pain, palpitations, shortness of breath, fever, nausea, or vomiting. Past Medical History:   Diagnosis Date    Arthritis     Back pain     Blood in stool     Diabetes ([facility] [address]     Scoliosis        Past Surgical History:   Procedure Laterality Date    ENDOSCOPY, COLON, DIAGNOSTIC  01/31/06    with polypectomy         History reviewed. No pertinent family history. Social History     Socioeconomic History    Marital status:      Spouse name: Not on file    Number of children: Not on file    Years of education: Not on file    Highest education level: Not on file   Occupational History    Not on file   Tobacco Use    Smoking status: Former Smoker    Smokeless tobacco: Never Used   Substance and Sexual Activity    Alcohol use: Not on file    Drug use: No    Sexual activity: Not on file   Other Topics Concern    Not on file   Social History Narrative    Not on file     Social Determinants of Health     Financial Resource Strain:     Difficulty of Paying Living Expenses:    Food Insecurity:     Worried About [address] in the Last Year:     [address] in the Last Year:    Transportation Needs:     Lack of Transportation (Medical):      Lack of Transportation (Non-Medical):    Physical Activity:     Days of Exercise per Week:     Minutes of Exercise per Session:    Stress:     Feeling of Stress :    Social Connections:     Frequency of Communication with Friends and Family:     Frequency of Social Gatherings with Friends and Family:     Attends Ca[employer] Services:     Active Member of Clubs or Organizations:     Attends Club or Organization Meetings:     Marital Status:    Intimate Partner Violence:     Fear of Current or Ex-Partner:     Emotionally Abused:     Physically Abused:     Sexually Abused: ALLERGIES: Patient has no known allergies. Review of Systems   Respiratory: Negative for shortness of breath. Cardiovascular: Negative for chest pain. Gastrointestinal: Negative for abdominal pain. Neurological: Positive for syncope. All other systems reviewed and are negative. Vitals:    07/21/21 1430 07/21/21 1500 07/21/21 1530 07/21/21 1600   BP: (!) 142/75 (!) 146/94 (!) 130/92 (!) 144/94   Pulse: (!) 112 99 97 94   Resp: 21 15 16 20   Temp:       SpO2: 93% 93% 96% 96%            Physical Exam  Vitals and nursing note reviewed. Constitutional:       General: He is not in acute distress. Appearance: He is well-developed. HENT:      Head: Normocephalic and atraumatic. Eyes:      General: No scleral icterus. Cardiovascular:      Rate and Rhythm: Normal rate and regular rhythm. Heart sounds: Normal heart sounds. Pulmonary:      Effort: Pulmonary effort is normal.      Breath sounds: Normal breath sounds. Abdominal:      Tenderness: There is no abdominal tenderness. Musculoskeletal:         General: No deformity or signs of injury. Skin:     General: Skin is warm and dry. Neurological:      Mental Status: He is alert and oriented to person, place, and time. EKG interpreted for myself at 1414 hrs. reveals sinus tachycardia, rate 122. No ST elevations.       Recent Results (from the past 12 hour(s))   EKG, 12 LEAD, INITIAL    Collection Time: 07/21/21  2:13 PM   Result Value Ref Range    Ventricular Rate 122 BPM    Atrial Rate 122 BPM    P-R Interval 134 ms    QRS Duration 86 ms    Q-T Interval 324 ms QTC Calculation (Bezet) 461 ms    Calculated P Axis 50 degrees    Calculated R Axis -5 degrees    Calculated T Axis 29 degrees    Diagnosis       Sinus tachycardia  Possible Anterior infarct , age undetermined  Abnormal ECG  No previous ECGs available     GLUCOSE, POC    Collection Time: 07/21/21  5:03 PM   Result Value Ref Range    Glucose (POC) 153 (H) 70 - 110 mg/dL         MDM  Number of Diagnoses or Management Options  Hypoglycemic episode in patient with diabetes mellitus ([email or web address]  Syncope, unspecified syncope type  Diagnosis management comments: Impression: Acute hypoglycemic episode. Subsequent syncope possibly secondary to vasovagal episode. Now awake and alert with no focal complaints. Initially tachycardic upon arrival heart rate proximately 90/min prior to discharge after 500 cc of normal saline. No chest pain or other focal complaints while in the ED. Procedures      Diagnosis:   1. Syncope, unspecified syncope type    2. Hypoglycemic episode in patient with diabetes mellitus ([facility] [[email or web address].)          Disposition: home    Follow-up Information     Follow up With Specialties Details Why Contact Info    Chris León MD Internal Medicine Schedule an appointment as soon as possible for a visit  for recheck of ongoing symptoms [address]  [address]      [id number] [facility] EMERGENCY DEPT Emergency Medicine  As needed, If symptoms worsen [address]  [phone]          Patient's Medications   Start Taking    No medications on file   Continue Taking    ASPIRIN, BUFFERED 81 MG TAB    Take  by mouth. CLOTRIMAZOLE (LOTRIMIN) 1 % TOPICAL CREAM    Apply  to affected area two (2) times a day. CYCLOBENZAPRINE (FLEXERIL) 10 MG TABLET    Take one tab by mouth three times a day as need for pain. ECONAZOLE NITRATE (SPECTAZOLE) 1 % TOPICAL CREAM    Apply  to affected area two (2) times a day.     GLIPIZIDE (GLUCOTROL) 5 MG TABLET TAKE ONE TABLET BY MOUTH ONCE DAILY    HYDROCORTISONE (PROCTOSOL HC) 2.5 % RECTAL CREAM    Insert  into rectum four (4) times daily. LISINOPRIL (PRINIVIL, ZESTRIL) 20 MG TABLET    TAKE 1 TABLET BY MOUTH DAILY    LOVASTATIN (MEVACOR) 20 MG TABLET    Take 1 Tab by mouth daily. METFORMIN (GLUCOPHAGE) 500 MG TABLET    take 1 tablet by mouth twice daily with meals    NAPROXEN (NAPROSYN) 500 MG TABLET    Take 1 Tab by mouth two (2) times daily (with meals). NYSTATIN (MYCOSTATIN) POWDER    Apply twice daily to affected area    OMEGA-3 FATTY ACIDS CAP    Take  by mouth.     PANTOPRAZOLE (PROTONIX) 40 MG TABLET    TAKE ONE TABLET BY MOUTH DAILY   These Medications have changed    No medications on file   Stop Taking    No medications on file

## 2021-07-22 LAB
ATRIAL RATE: 122 BPM
CALCULATED P AXIS, ECG09: 50 DEGREES
CALCULATED R AXIS, ECG10: -5 DEGREES
CALCULATED T AXIS, ECG11: 29 DEGREES
DIAGNOSIS, 93000: NORMAL
P-R INTERVAL, ECG05: 134 MS
Q-T INTERVAL, ECG07: 324 MS
QRS DURATION, ECG06: 86 MS
QTC CALCULATION (BEZET), ECG08: 461 MS
VENTRICULAR RATE, ECG03: 122 BPM